# Patient Record
Sex: MALE | Race: WHITE | NOT HISPANIC OR LATINO | Employment: FULL TIME | URBAN - NONMETROPOLITAN AREA
[De-identification: names, ages, dates, MRNs, and addresses within clinical notes are randomized per-mention and may not be internally consistent; named-entity substitution may affect disease eponyms.]

---

## 2023-06-28 ENCOUNTER — TELEPHONE (OUTPATIENT)
Dept: PRIMARY CARE | Facility: CLINIC | Age: 68
End: 2023-06-28
Payer: MEDICARE

## 2023-06-28 DIAGNOSIS — K13.70 MOUTH LESION: Primary | ICD-10-CM

## 2023-06-28 DIAGNOSIS — D69.1 THROMBOCYTOPATHIA (MULTI): ICD-10-CM

## 2023-06-28 NOTE — TELEPHONE ENCOUNTER
"Left message for patient.    Also an FYI, he stated his dentist noticed a \"spot\" in his throat six months ago and the same dentist told him at his appointment today that the spot remained unchanged.  I asked him to contact his dentist and have them fax over their findings/images so if needed he could be referred to ENT or scheduled to see you.    "

## 2023-06-28 NOTE — TELEPHONE ENCOUNTER
Patient would like a new CBC order put in    He would like to continue donating blood and would like his platelet counts rechecked     Please call [phone] when this is ready

## 2023-06-29 NOTE — TELEPHONE ENCOUNTER
Left message for patient to call back and schedule (there is next week available in [place] on [facility])

## 2023-06-30 ENCOUNTER — LAB (OUTPATIENT)
Dept: LAB | Facility: LAB | Age: 68
End: 2023-06-30
Payer: MEDICARE

## 2023-06-30 DIAGNOSIS — D69.1 THROMBOCYTOPATHIA (MULTI): ICD-10-CM

## 2023-08-14 ENCOUNTER — LAB (OUTPATIENT)
Dept: LAB | Facility: LAB | Age: 68
End: 2023-08-14
Payer: MEDICARE

## 2023-08-14 DIAGNOSIS — D69.6 THROMBOCYTOPENIA (CMS-HCC): ICD-10-CM

## 2023-08-14 DIAGNOSIS — D69.6 THROMBOCYTOPENIA (CMS-HCC): Primary | ICD-10-CM

## 2023-08-14 PROCEDURE — 36415 COLL VENOUS BLD VENIPUNCTURE: CPT

## 2023-08-14 PROCEDURE — 85025 COMPLETE CBC W/AUTO DIFF WBC: CPT

## 2023-08-15 LAB
BASOPHILS (10*3/UL) IN BLOOD BY AUTOMATED COUNT: 0.03 X10E9/L (ref 0–0.1)
BASOPHILS/100 LEUKOCYTES IN BLOOD BY AUTOMATED COUNT: 0.4 % (ref 0–2)
EOSINOPHILS (10*3/UL) IN BLOOD BY AUTOMATED COUNT: 0.32 X10E9/L (ref 0–0.7)
EOSINOPHILS/100 LEUKOCYTES IN BLOOD BY AUTOMATED COUNT: 4.7 % (ref 0–6)
ERYTHROCYTE DISTRIBUTION WIDTH (RATIO) BY AUTOMATED COUNT: 13.1 % (ref 11.5–14.5)
ERYTHROCYTE MEAN CORPUSCULAR HEMOGLOBIN CONCENTRATION (G/DL) BY AUTOMATED: 32.3 G/DL (ref 32–36)
ERYTHROCYTE MEAN CORPUSCULAR VOLUME (FL) BY AUTOMATED COUNT: 91 FL (ref 80–100)
ERYTHROCYTES (10*6/UL) IN BLOOD BY AUTOMATED COUNT: 5.24 X10E12/L (ref 4.5–5.9)
HEMATOCRIT (%) IN BLOOD BY AUTOMATED COUNT: 47.7 % (ref 41–52)
HEMOGLOBIN (G/DL) IN BLOOD: 15.4 G/DL (ref 13.5–17.5)
IMMATURE GRANULOCYTES/100 LEUKOCYTES IN BLOOD BY AUTOMATED COUNT: 0.3 % (ref 0–0.9)
LEUKOCYTES (10*3/UL) IN BLOOD BY AUTOMATED COUNT: 6.8 X10E9/L (ref 4.4–11.3)
LYMPHOCYTES (10*3/UL) IN BLOOD BY AUTOMATED COUNT: 1.48 X10E9/L (ref 1.2–4.8)
LYMPHOCYTES/100 LEUKOCYTES IN BLOOD BY AUTOMATED COUNT: 21.7 % (ref 13–44)
MONOCYTES (10*3/UL) IN BLOOD BY AUTOMATED COUNT: 0.62 X10E9/L (ref 0.1–1)
MONOCYTES/100 LEUKOCYTES IN BLOOD BY AUTOMATED COUNT: 9.1 % (ref 2–10)
NEUTROPHILS (10*3/UL) IN BLOOD BY AUTOMATED COUNT: 4.36 X10E9/L (ref 1.2–7.7)
NEUTROPHILS/100 LEUKOCYTES IN BLOOD BY AUTOMATED COUNT: 63.8 % (ref 40–80)
NRBC (PER 100 WBCS) BY AUTOMATED COUNT: 0 /100 WBC (ref 0–0)
PLATELETS (10*3/UL) IN BLOOD AUTOMATED COUNT: 105 X10E9/L (ref 150–450)
RBC MORPHOLOGY IN BLOOD: NORMAL

## 2023-12-04 ENCOUNTER — OFFICE VISIT (OUTPATIENT)
Dept: PRIMARY CARE | Facility: CLINIC | Age: 68
End: 2023-12-04
Payer: MEDICARE

## 2023-12-04 ENCOUNTER — LAB (OUTPATIENT)
Dept: LAB | Facility: LAB | Age: 68
End: 2023-12-04
Payer: MEDICARE

## 2023-12-04 VITALS
BODY MASS INDEX: 24.27 KG/M2 | HEART RATE: 66 BPM | OXYGEN SATURATION: 96 % | RESPIRATION RATE: 16 BRPM | TEMPERATURE: 97.1 F | HEIGHT: 72 IN | SYSTOLIC BLOOD PRESSURE: 106 MMHG | WEIGHT: 179.2 LBS | DIASTOLIC BLOOD PRESSURE: 66 MMHG

## 2023-12-04 DIAGNOSIS — Z12.5 SCREENING FOR PROSTATE CANCER: ICD-10-CM

## 2023-12-04 DIAGNOSIS — D69.6 THROMBOCYTOPENIA (CMS-HCC): ICD-10-CM

## 2023-12-04 DIAGNOSIS — Z00.00 MEDICARE ANNUAL WELLNESS VISIT, SUBSEQUENT: ICD-10-CM

## 2023-12-04 DIAGNOSIS — Z00.00 ROUTINE GENERAL MEDICAL EXAMINATION AT HEALTH CARE FACILITY: Primary | ICD-10-CM

## 2023-12-04 PROBLEM — R05.9 COUGHING: Status: ACTIVE | Noted: 2023-12-04

## 2023-12-04 PROBLEM — R73.01 FASTING HYPERGLYCEMIA: Status: ACTIVE | Noted: 2023-12-04

## 2023-12-04 PROBLEM — R73.01 FASTING HYPERGLYCEMIA: Status: RESOLVED | Noted: 2023-12-04 | Resolved: 2023-12-04

## 2023-12-04 PROBLEM — E87.5 HYPERKALEMIA: Status: ACTIVE | Noted: 2023-12-04

## 2023-12-04 PROBLEM — E87.5 HYPERKALEMIA: Status: RESOLVED | Noted: 2023-12-04 | Resolved: 2023-12-04

## 2023-12-04 PROBLEM — L98.9 SKIN LESION OF BACK: Status: RESOLVED | Noted: 2023-12-04 | Resolved: 2023-12-04

## 2023-12-04 PROBLEM — R09.89 THROAT CLEARING: Status: RESOLVED | Noted: 2023-12-04 | Resolved: 2023-12-04

## 2023-12-04 PROBLEM — Z96.21 COCHLEAR IMPLANT IN PLACE: Status: ACTIVE | Noted: 2023-12-04

## 2023-12-04 PROBLEM — E78.5 MILD HYPERLIPIDEMIA: Status: ACTIVE | Noted: 2023-12-04

## 2023-12-04 PROBLEM — R09.89 THROAT CLEARING: Status: ACTIVE | Noted: 2023-12-04

## 2023-12-04 PROBLEM — R05.9 COUGHING: Status: RESOLVED | Noted: 2023-12-04 | Resolved: 2023-12-04

## 2023-12-04 PROBLEM — L98.9 SKIN LESION OF BACK: Status: ACTIVE | Noted: 2023-12-04

## 2023-12-04 PROBLEM — E78.5 MILD HYPERLIPIDEMIA: Status: RESOLVED | Noted: 2023-12-04 | Resolved: 2023-12-04

## 2023-12-04 PROBLEM — R42 DISEQUILIBRIUM: Status: ACTIVE | Noted: 2023-12-04

## 2023-12-04 LAB
ALBUMIN SERPL BCP-MCNC: 4.4 G/DL (ref 3.4–5)
ALP SERPL-CCNC: 69 U/L (ref 33–136)
ALT SERPL W P-5'-P-CCNC: 23 U/L (ref 10–52)
ANION GAP SERPL CALC-SCNC: 14 MMOL/L (ref 10–20)
AST SERPL W P-5'-P-CCNC: 18 U/L (ref 9–39)
BILIRUB SERPL-MCNC: 0.7 MG/DL (ref 0–1.2)
BUN SERPL-MCNC: 16 MG/DL (ref 6–23)
CALCIUM SERPL-MCNC: 9.2 MG/DL (ref 8.6–10.6)
CHLORIDE SERPL-SCNC: 102 MMOL/L (ref 98–107)
CHOLEST SERPL-MCNC: 193 MG/DL (ref 0–199)
CHOLESTEROL/HDL RATIO: 4.3
CO2 SERPL-SCNC: 28 MMOL/L (ref 21–32)
CREAT SERPL-MCNC: 0.97 MG/DL (ref 0.5–1.3)
ERYTHROCYTE [DISTWIDTH] IN BLOOD BY AUTOMATED COUNT: 12.4 % (ref 11.5–14.5)
GFR SERPL CREATININE-BSD FRML MDRD: 86 ML/MIN/1.73M*2
GLUCOSE SERPL-MCNC: 108 MG/DL (ref 74–99)
HCT VFR BLD AUTO: 49.4 % (ref 41–52)
HDLC SERPL-MCNC: 44.5 MG/DL
HGB BLD-MCNC: 16.3 G/DL (ref 13.5–17.5)
LDLC SERPL CALC-MCNC: 110 MG/DL
MCH RBC QN AUTO: 29.4 PG (ref 26–34)
MCHC RBC AUTO-ENTMCNC: 33 G/DL (ref 32–36)
MCV RBC AUTO: 89 FL (ref 80–100)
NON HDL CHOLESTEROL: 149 MG/DL (ref 0–149)
NRBC BLD-RTO: 0 /100 WBCS (ref 0–0)
PLATELET # BLD AUTO: 114 X10*3/UL (ref 150–450)
POTASSIUM SERPL-SCNC: 4.6 MMOL/L (ref 3.5–5.3)
PROT SERPL-MCNC: 6.9 G/DL (ref 6.4–8.2)
PSA SERPL-MCNC: 1.76 NG/ML
RBC # BLD AUTO: 5.55 X10*6/UL (ref 4.5–5.9)
SODIUM SERPL-SCNC: 139 MMOL/L (ref 136–145)
TRIGL SERPL-MCNC: 195 MG/DL (ref 0–149)
VLDL: 39 MG/DL (ref 0–40)
WBC # BLD AUTO: 6.6 X10*3/UL (ref 4.4–11.3)

## 2023-12-04 PROCEDURE — 1160F RVW MEDS BY RX/DR IN RCRD: CPT | Performed by: FAMILY MEDICINE

## 2023-12-04 PROCEDURE — G0103 PSA SCREENING: HCPCS

## 2023-12-04 PROCEDURE — 85027 COMPLETE CBC AUTOMATED: CPT

## 2023-12-04 PROCEDURE — 80053 COMPREHEN METABOLIC PANEL: CPT

## 2023-12-04 PROCEDURE — G0439 PPPS, SUBSEQ VISIT: HCPCS | Performed by: FAMILY MEDICINE

## 2023-12-04 PROCEDURE — 1159F MED LIST DOCD IN RCRD: CPT | Performed by: FAMILY MEDICINE

## 2023-12-04 PROCEDURE — 80061 LIPID PANEL: CPT

## 2023-12-04 PROCEDURE — 36415 COLL VENOUS BLD VENIPUNCTURE: CPT

## 2023-12-04 PROCEDURE — 1036F TOBACCO NON-USER: CPT | Performed by: FAMILY MEDICINE

## 2023-12-04 PROCEDURE — 1170F FXNL STATUS ASSESSED: CPT | Performed by: FAMILY MEDICINE

## 2023-12-04 RX ORDER — PANTOPRAZOLE SODIUM 40 MG/1
TABLET, DELAYED RELEASE ORAL
COMMUNITY
Start: 2023-07-28 | End: 2023-12-04 | Stop reason: WASHOUT

## 2023-12-04 ASSESSMENT — PATIENT HEALTH QUESTIONNAIRE - PHQ9
SUM OF ALL RESPONSES TO PHQ9 QUESTIONS 1 AND 2: 0
2. FEELING DOWN, DEPRESSED OR HOPELESS: NOT AT ALL
1. LITTLE INTEREST OR PLEASURE IN DOING THINGS: NOT AT ALL

## 2023-12-04 ASSESSMENT — ACTIVITIES OF DAILY LIVING (ADL)
GROCERY_SHOPPING: INDEPENDENT
DRESSING: INDEPENDENT
MANAGING_FINANCES: INDEPENDENT
DOING_HOUSEWORK: INDEPENDENT
TAKING_MEDICATION: INDEPENDENT
BATHING: INDEPENDENT

## 2023-12-04 NOTE — PROGRESS NOTES
Subjective   Reason for Visit: Jorge Farr is an 68 y.o. male here for a Medicare Wellness visit.     Past Medical, Surgical, and Family History reviewed and updated in chart.  Stopped giving blood due to low platelets over a year ago.    No health issues or problems.   No falls.  Does want to recheck platelets.    No headaches, no chest pain or SHORTNESS OF BREATH, no GERD, no constipation or diarrhea    Reviewed all medications by prescribing practitioner or clinical pharmacist (such as prescriptions, OTCs, herbal therapies and supplements) and documented in the medical record.      Patient Care Team:  Poncho Lincoln MD as PCP - General  Poncho Lincoln MD as PCP - [employer] PCP         Objective   Vitals:  /66 (BP Location: Left arm, Patient Position: Sitting, BP Cuff Size: Adult)   Pulse 66   Temp 36.2 °C (97.1 °F)   Resp 16   Ht 1.829 m (6')   Wt 81.3 kg (179 lb 3.2 oz)   SpO2 96%   BMI 24.30 kg/m²       Physical Exam  Constitutional:       General: He is not in acute distress.  HENT:      Head: Normocephalic and atraumatic.      Right Ear: Tympanic membrane, ear canal and external ear normal.      Left Ear: Tympanic membrane, ear canal and external ear normal.      Nose: Nose normal.      Mouth/Throat:      Mouth: Mucous membranes are moist.      Pharynx: No posterior oropharyngeal erythema.   Eyes:      General: No scleral icterus.     Extraocular Movements: Extraocular movements intact.      Pupils: Pupils are equal, round, and reactive to light.   Cardiovascular:      Rate and Rhythm: Normal rate and regular rhythm.      Pulses: Normal pulses.      Heart sounds: No murmur heard.  Pulmonary:      Effort: Pulmonary effort is normal. No respiratory distress.      Breath sounds: Normal breath sounds. No wheezing.   Abdominal:      General: Bowel sounds are normal. There is no distension.      Palpations: Abdomen is soft.      Tenderness: There is no abdominal tenderness.    Musculoskeletal:         General: Normal range of motion.      Cervical back: Neck supple. No rigidity.      Right lower leg: No edema.      Left lower leg: No edema.   Lymphadenopathy:      Cervical: No cervical adenopathy.   Skin:     General: Skin is warm and dry.      Findings: No rash.   Neurological:      General: No focal deficit present.      Mental Status: He is alert and oriented to person, place, and time.   Psychiatric:         Mood and Affect: Mood normal.         Thought Content: Thought content normal.         Assessment/Plan   Problem List Items Addressed This Visit       Thrombocytopenia (CMS/HCC)    Current Assessment & Plan     Last platelet count was 105 in 8/2023, had been 147 a year ago. No bleeding issues currently.          Relevant Orders    CBC     Other Visit Diagnoses       Routine general medical examination at health care facility    -  Primary    Medicare annual wellness visit, subsequent        Relevant Orders    CBC    Lipid panel    Comprehensive metabolic panel    Screening for prostate cancer        Relevant Orders    Prostate Spec.Ag,Screen

## 2024-03-18 NOTE — PROGRESS NOTES
Subjective   Patient ID: Jorge Farr is a 68 y.o. male who presents for No chief complaint on file..  HPI    Review of Systems    Objective   Physical Exam    Assessment/Plan   {Assess/PlanSmartLinks:65082}         Farhan Rajan DMD, MD 03/18/24 10:00 AM

## 2024-03-25 ENCOUNTER — APPOINTMENT (OUTPATIENT)
Dept: OTOLARYNGOLOGY | Facility: CLINIC | Age: 69
End: 2024-03-25
Payer: MEDICARE

## 2024-03-27 ENCOUNTER — OFFICE VISIT (OUTPATIENT)
Dept: OTOLARYNGOLOGY | Facility: CLINIC | Age: 69
End: 2024-03-27
Payer: MEDICARE

## 2024-03-27 VITALS — WEIGHT: 178 LBS | BODY MASS INDEX: 24.14 KG/M2

## 2024-03-27 DIAGNOSIS — K21.9 LARYNGOPHARYNGEAL REFLUX (LPR): Primary | ICD-10-CM

## 2024-03-27 DIAGNOSIS — R09.89 THROAT CLEARING: ICD-10-CM

## 2024-03-27 PROCEDURE — 1036F TOBACCO NON-USER: CPT | Performed by: STUDENT IN AN ORGANIZED HEALTH CARE EDUCATION/TRAINING PROGRAM

## 2024-03-27 PROCEDURE — 31575 DIAGNOSTIC LARYNGOSCOPY: CPT | Performed by: STUDENT IN AN ORGANIZED HEALTH CARE EDUCATION/TRAINING PROGRAM

## 2024-03-27 PROCEDURE — 99213 OFFICE O/P EST LOW 20 MIN: CPT | Performed by: STUDENT IN AN ORGANIZED HEALTH CARE EDUCATION/TRAINING PROGRAM

## 2024-03-27 PROCEDURE — 1159F MED LIST DOCD IN RCRD: CPT | Performed by: STUDENT IN AN ORGANIZED HEALTH CARE EDUCATION/TRAINING PROGRAM

## 2024-03-27 ASSESSMENT — PATIENT HEALTH QUESTIONNAIRE - PHQ9
2. FEELING DOWN, DEPRESSED OR HOPELESS: NOT AT ALL
SUM OF ALL RESPONSES TO PHQ9 QUESTIONS 1 AND 2: 0
1. LITTLE INTEREST OR PLEASURE IN DOING THINGS: NOT AT ALL

## 2024-03-27 NOTE — PROGRESS NOTES
Assessment  Laryngopharyngeal reflux  Throat clearing  History of oral/throat lesion     Plan  68-year-old male presenting for evaluation of possible oral/ throat lesion noted by his dentist.  No lesions/masses or ulcerations noted on physical exam or flexible nasopharyngoscopy.  Reflux symptomatology/throat clearing has resolved with dietary modifications.   He will continue to monitor the region and follow-up as needed.       History of Present Illness  3/27/24  The patient presents for follow-up, states he has been doing well.  No oropharyngeal complaints.  9/25/23  The patient presents for follow-up, reports reflux symptomatology has resolved including throat clearing and coughing.  Currently controlled with dietary modifications, did not start pantoprazole. No complaints.  Recall   67-year-old male presenting for initial evaluation of multiple ENT complaints.  He was elevated by his dentist > 1 month ago where a possible throat lesion was noted, he presents today for evaluation. The patient reports he is asymptomatic, denies dysphagia, odynophagia, oral bleeding/hemoptysis, fevers, chills, night sweats, neck pain, neck lumps or bumps, otalgia, weight loss.  The patient also endorses throat clearing and coughing happening several times a week.  Denies sinonasal symptomatology or heartburn     Of note the patient has a history of left CI    Past Medical History:   Diagnosis Date    Abdominal distension (gaseous) 07/22/2016    Abdominal bloating    Flatulence 07/22/2016    Excessive flatus    HL (hearing loss) At birth    Migraine with aura, not intractable, without status migrainosus 04/19/2017    Ophthalmic migraine    Other specified symptoms and signs involving the digestive system and abdomen 07/22/2016    Change in bowel function    Personal history of other diseases of male genital organs     History of hydrocele    Personal history of other diseases of the musculoskeletal system and connective tissue      History of arthritis    Personal history of other diseases of the nervous system and sense organs 02/14/2017    History of migraine with aura    Transient cerebral ischemic attack, unspecified 04/19/2017    TIA (transient ischemic attack)    Unspecified visual disturbance 04/19/2017    Vision changes       Past Surgical History:   Procedure Laterality Date    CT ANGIO NECK  12/14/2016    CT NECK ANGIO W AND WO IV CONTRAST 12/14/2016 [facility] [place] [facility]    CT HEAD ANGIO W AND WO IV CONTRAST  12/14/2016    CT HEAD ANGIO W AND WO IV CONTRAST 12/14/2016 [facility] [place] [facility]    FRACTURE SURGERY  Broken left collarbone    JOINT REPLACEMENT  Right knee replacement    OTHER SURGICAL HISTORY  08/26/2014    Closed Treatment Of Clavicular Fracture    OTHER SURGICAL HISTORY  08/26/2014    Inner Ear Surgery Cochlear Device Implantation    OTHER SURGICAL HISTORY  11/12/2018    Cochlear implant surgery    OTHER SURGICAL HISTORY  11/12/2018    Knee arthroscopy         No current outpatient medications on file prior to visit.     No current facility-administered medications on file prior to visit.        No Known Allergies     Review of Systems  A detailed 12 point ROS was performed and is negative except as noted in the intake form, HPI and/or Past Medical History        Physical Exam  CONSTITUTIONAL: Well-developed, NAD  VOICE: Normal voice quality  RESPIRATION: Breathing comfortably, no stridor.  CV: No clubbing/cyanosis/edema in hands.  EYES: EOM Intact, sclera normal.  NEURO: Alert and oriented times 3, Cranial nerves V,VII intact and symmetric bilaterally.  HEAD AND FACE: Symmetric facial features, no masses or lesions, sinuses nontender to palpation.  SALIVARY GLANDS: Parotid and submandibular glands normal bilaterally.  EARS: Normal external ears, external auditory canals, and TMs to otoscopy.   Left CI in place.   NOSE: External nose midline, anterior rhinoscopy is normal with limited visualization to the anterior aspect  of the interior turbinates. No lesions noted.  ORAL CAVITY/OROPHARYNX/LIPS: Normal mucous membranes, normal floor of mouth/tongue/OP, no masses or lesions are noted.  PHARYNGEAL WALLS AND NASOPHARYNX: No masses noted. Mucosa appears clean and moist  NECK/LYMPH: No LAD, no thyroid masses. Trachea palpably midline  SKIN: Neck skin is without injury  PSYCH: Alert and oriented with appropriate mood and affect        Procedure  Nasal endoscopy:  PROCEDURE NOTE     For better visualization because of septal deviation, turbinate hypertrophy nasal endoscopy was performed after verbal consent was obtained by the patient and/or guardian. Both nostrils were sprayed with a mixture of lidocaine 4% and Afrin. After a sufficient amount of time elapsed for mucosal anesthesia to take place, the nasal endoscope was advanced into the nostril.     The following areas were visualized:  Nasal passage, nasal septum, turbinates, middle meatus, nasopharynx, sinus ostia     The patient tolerated the procedure well and these structures were found to be normal except as follows:  No lesions/masses  Mobile true vocal cords bilaterally  Moderate postcricoid edema  No pooling secretions  No mucopurulence, polyps, nor masses seen in inferior meati, middle meati, nor sphenoethmoidal recesses bilaterally.

## 2024-06-18 ENCOUNTER — TELEPHONE (OUTPATIENT)
Dept: PRIMARY CARE | Facility: CLINIC | Age: 69
End: 2024-06-18
Payer: MEDICARE

## 2024-06-18 NOTE — TELEPHONE ENCOUNTER
I called pt to get him scheduled for mwv   Last one was 12/2023  He can be seen anytime for this visit first available with sdh

## 2024-06-24 DIAGNOSIS — E78.2 MODERATE MIXED HYPERLIPIDEMIA NOT REQUIRING STATIN THERAPY: ICD-10-CM

## 2024-06-24 DIAGNOSIS — D69.6 THROMBOCYTOPENIA (CMS-HCC): ICD-10-CM

## 2024-06-24 DIAGNOSIS — R73.01 ELEVATED FASTING BLOOD SUGAR: ICD-10-CM

## 2024-06-24 DIAGNOSIS — Z00.00 WELLNESS EXAMINATION: Primary | ICD-10-CM

## 2024-06-24 DIAGNOSIS — Z12.5 PROSTATE CANCER SCREENING: ICD-10-CM

## 2024-06-25 NOTE — TELEPHONE ENCOUNTER
Left detailed message on pt's vm that lab orders were placed. Advised pt to cb if he had any further questions/concerns.

## 2024-09-23 ENCOUNTER — TELEPHONE (OUTPATIENT)
Dept: PRIMARY CARE | Facility: CLINIC | Age: 69
End: 2024-09-23
Payer: MEDICARE

## 2024-09-23 DIAGNOSIS — U07.1 COVID-19: Primary | ICD-10-CM

## 2024-09-23 NOTE — TELEPHONE ENCOUNTER
Onset of Covid illness- 9/21  Date & Location of positive covid 19 test- 9/22  (If negative test please list details of exposure and why requesting MOAB treatment )  Current pregnancy - no  Vaccinated for covid 19- Yes no recent booster  Current Symptoms  - fever, sore throat, cough, fatigue, describes as mild/moderate  Temperature- 101.5  Current Pulse O2 - N/A    If RX for Paxlovid is appropriate, is patient willing to use [facility]? Yes   If yes, [facility]? [facility], added to chart   If no, patient's preferred pharmacy is:  Patient was instructed to call pharmacy to confirm that Paxlovid is available and in stock.    I have asked patient and they report they have NO increased SOB, No CP, No confusion, No change in skin color ( blue or gray).  Next available virtual opening is - 9/24    Provider if you can please review patient's chart and medical history and provide detailed instructions on next steps in this task.   Thank you    Asking for paxlovid

## 2024-12-20 ENCOUNTER — LAB (OUTPATIENT)
Dept: LAB | Facility: LAB | Age: 69
End: 2024-12-20
Payer: MEDICARE

## 2024-12-20 DIAGNOSIS — D69.6 THROMBOCYTOPENIA (CMS-HCC): ICD-10-CM

## 2024-12-20 DIAGNOSIS — E78.2 MODERATE MIXED HYPERLIPIDEMIA NOT REQUIRING STATIN THERAPY: ICD-10-CM

## 2024-12-20 DIAGNOSIS — R73.01 ELEVATED FASTING BLOOD SUGAR: ICD-10-CM

## 2024-12-20 DIAGNOSIS — Z12.5 PROSTATE CANCER SCREENING: ICD-10-CM

## 2024-12-20 LAB
ALBUMIN SERPL BCP-MCNC: 4.5 G/DL (ref 3.4–5)
ALP SERPL-CCNC: 61 U/L (ref 33–136)
ALT SERPL W P-5'-P-CCNC: 20 U/L (ref 10–52)
ANION GAP SERPL CALC-SCNC: 8 MMOL/L (ref 10–20)
AST SERPL W P-5'-P-CCNC: 19 U/L (ref 9–39)
BASOPHILS # BLD AUTO: 0.03 X10*3/UL (ref 0–0.1)
BASOPHILS NFR BLD AUTO: 0.6 %
BILIRUB SERPL-MCNC: 0.7 MG/DL (ref 0–1.2)
BUN SERPL-MCNC: 17 MG/DL (ref 6–23)
CALCIUM SERPL-MCNC: 9.3 MG/DL (ref 8.6–10.6)
CHLORIDE SERPL-SCNC: 105 MMOL/L (ref 98–107)
CHOLEST SERPL-MCNC: 165 MG/DL (ref 0–199)
CHOLESTEROL/HDL RATIO: 3.5
CO2 SERPL-SCNC: 33 MMOL/L (ref 21–32)
CREAT SERPL-MCNC: 1.06 MG/DL (ref 0.5–1.3)
EGFRCR SERPLBLD CKD-EPI 2021: 76 ML/MIN/1.73M*2
EOSINOPHIL # BLD AUTO: 0.29 X10*3/UL (ref 0–0.7)
EOSINOPHIL NFR BLD AUTO: 5.9 %
ERYTHROCYTE [DISTWIDTH] IN BLOOD BY AUTOMATED COUNT: 12.6 % (ref 11.5–14.5)
EST. AVERAGE GLUCOSE BLD GHB EST-MCNC: 105 MG/DL
GLUCOSE SERPL-MCNC: 101 MG/DL (ref 74–99)
HBA1C MFR BLD: 5.3 %
HCT VFR BLD AUTO: 50 % (ref 41–52)
HDLC SERPL-MCNC: 46.7 MG/DL
HGB BLD-MCNC: 16.3 G/DL (ref 13.5–17.5)
IMM GRANULOCYTES # BLD AUTO: 0.01 X10*3/UL (ref 0–0.7)
IMM GRANULOCYTES NFR BLD AUTO: 0.2 % (ref 0–0.9)
LDLC SERPL CALC-MCNC: 93 MG/DL
LYMPHOCYTES # BLD AUTO: 1.37 X10*3/UL (ref 1.2–4.8)
LYMPHOCYTES NFR BLD AUTO: 28.1 %
MCH RBC QN AUTO: 29.1 PG (ref 26–34)
MCHC RBC AUTO-ENTMCNC: 32.6 G/DL (ref 32–36)
MCV RBC AUTO: 89 FL (ref 80–100)
MONOCYTES # BLD AUTO: 0.39 X10*3/UL (ref 0.1–1)
MONOCYTES NFR BLD AUTO: 8 %
NEUTROPHILS # BLD AUTO: 2.79 X10*3/UL (ref 1.2–7.7)
NEUTROPHILS NFR BLD AUTO: 57.2 %
NON HDL CHOLESTEROL: 118 MG/DL (ref 0–149)
NRBC BLD-RTO: 0 /100 WBCS (ref 0–0)
PLATELET # BLD AUTO: 129 X10*3/UL (ref 150–450)
POTASSIUM SERPL-SCNC: 4.7 MMOL/L (ref 3.5–5.3)
PROT SERPL-MCNC: 7.1 G/DL (ref 6.4–8.2)
PSA SERPL-MCNC: 1.72 NG/ML
RBC # BLD AUTO: 5.61 X10*6/UL (ref 4.5–5.9)
SODIUM SERPL-SCNC: 141 MMOL/L (ref 136–145)
TRIGL SERPL-MCNC: 127 MG/DL (ref 0–149)
VLDL: 25 MG/DL (ref 0–40)
WBC # BLD AUTO: 4.9 X10*3/UL (ref 4.4–11.3)

## 2024-12-20 PROCEDURE — 83036 HEMOGLOBIN GLYCOSYLATED A1C: CPT

## 2024-12-20 PROCEDURE — 80053 COMPREHEN METABOLIC PANEL: CPT

## 2024-12-20 PROCEDURE — G0103 PSA SCREENING: HCPCS

## 2024-12-20 PROCEDURE — 85025 COMPLETE CBC W/AUTO DIFF WBC: CPT

## 2024-12-20 PROCEDURE — 80061 LIPID PANEL: CPT

## 2024-12-20 PROCEDURE — 36415 COLL VENOUS BLD VENIPUNCTURE: CPT

## 2024-12-27 ENCOUNTER — APPOINTMENT (OUTPATIENT)
Dept: PRIMARY CARE | Facility: CLINIC | Age: 69
End: 2024-12-27
Payer: MEDICARE

## 2024-12-27 VITALS
OXYGEN SATURATION: 97 % | BODY MASS INDEX: 24.46 KG/M2 | HEART RATE: 68 BPM | SYSTOLIC BLOOD PRESSURE: 102 MMHG | DIASTOLIC BLOOD PRESSURE: 52 MMHG | WEIGHT: 180.6 LBS | TEMPERATURE: 97.5 F | HEIGHT: 72 IN

## 2024-12-27 DIAGNOSIS — Z00.00 WELLNESS EXAMINATION: ICD-10-CM

## 2024-12-27 DIAGNOSIS — Z00.00 MEDICARE ANNUAL WELLNESS VISIT, SUBSEQUENT: Primary | ICD-10-CM

## 2024-12-27 DIAGNOSIS — R19.05 PERIUMBILICAL MASS: ICD-10-CM

## 2024-12-27 DIAGNOSIS — H90.3 SENSORINEURAL HEARING LOSS, BILATERAL: ICD-10-CM

## 2024-12-27 DIAGNOSIS — Z96.21 COCHLEAR IMPLANT IN PLACE: ICD-10-CM

## 2024-12-27 DIAGNOSIS — Z12.5 PROSTATE CANCER SCREENING: ICD-10-CM

## 2024-12-27 DIAGNOSIS — D69.6 THROMBOCYTOPENIA (CMS-HCC): ICD-10-CM

## 2024-12-27 PROCEDURE — 99213 OFFICE O/P EST LOW 20 MIN: CPT | Performed by: FAMILY MEDICINE

## 2024-12-27 PROCEDURE — 1159F MED LIST DOCD IN RCRD: CPT | Performed by: FAMILY MEDICINE

## 2024-12-27 PROCEDURE — G0439 PPPS, SUBSEQ VISIT: HCPCS | Performed by: FAMILY MEDICINE

## 2024-12-27 PROCEDURE — 3008F BODY MASS INDEX DOCD: CPT | Performed by: FAMILY MEDICINE

## 2024-12-27 PROCEDURE — 1123F ACP DISCUSS/DSCN MKR DOCD: CPT | Performed by: FAMILY MEDICINE

## 2024-12-27 PROCEDURE — 1160F RVW MEDS BY RX/DR IN RCRD: CPT | Performed by: FAMILY MEDICINE

## 2024-12-27 PROCEDURE — 1170F FXNL STATUS ASSESSED: CPT | Performed by: FAMILY MEDICINE

## 2024-12-27 ASSESSMENT — ENCOUNTER SYMPTOMS
EYES NEGATIVE: 1
DIARRHEA: 0
CHEST TIGHTNESS: 0
SHORTNESS OF BREATH: 0
NAUSEA: 0
ENDOCRINE NEGATIVE: 1
CONSTITUTIONAL NEGATIVE: 1
MUSCULOSKELETAL NEGATIVE: 1
CONSTIPATION: 0
NEUROLOGICAL NEGATIVE: 1
HEMATOLOGIC/LYMPHATIC NEGATIVE: 1
GASTROINTESTINAL NEGATIVE: 1
PSYCHIATRIC NEGATIVE: 1
CARDIOVASCULAR NEGATIVE: 1
ALLERGIC/IMMUNOLOGIC NEGATIVE: 1
RESPIRATORY NEGATIVE: 1
VOMITING: 0

## 2024-12-27 ASSESSMENT — ACTIVITIES OF DAILY LIVING (ADL)
GROCERY_SHOPPING: INDEPENDENT
BATHING: INDEPENDENT
DRESSING: INDEPENDENT
DOING_HOUSEWORK: INDEPENDENT
TAKING_MEDICATION: INDEPENDENT
MANAGING_FINANCES: INDEPENDENT

## 2024-12-27 NOTE — PATIENT INSTRUCTIONS
1.  Well visit    Today in the office you had your annual wellness exam    You are up-to-date with your colonoscopy for colon cancer screening.    You are up-to-date with the PSA for prostate cancer screening your PSA was low normal indicating low risk for prostate disease    You are up-to-date with all your vaccines including your flu shot COVID booster pneumonia vaccines and your shingles vaccines    We did review your recent labs kidney function tests are normal liver enzymes are normal cholesterol is normal hemoglobin A1c is normal indicating no diabetes    Your platelet count remains slightly below normal we will continue to monitor annually I think it is safe for you to donate blood twice a year    On exam today you have some fullness right around the umbilicus or bellybutton I am concerned you may have a hernia here I am recommending you for start with an x-ray depending on those results we may need to get another scan or have you meet with a specialist    If you otherwise stay healthy I will see you back in 1 year but I will call you on the phone when I have the results of the x-ray

## 2024-12-27 NOTE — PROGRESS NOTES
Subjective   Patient ID: Jorge Farr is a 69 y.o. male who presents for Medicare Annual Wellness Visit Subsequent ([facility]).    HPI   The patient reports left knee pain. This is not effecting his quality of life.     The patient denies any changes in vision, hearing or dental.     The patient maintains they do not have any chest pain, chest tightness or shortness of breath.    They do not experience nausea, emesis, changes in bowel movements or dyspepsia.    The patient is trying to stay active and healthy. They are currently exercising and remaining physically active. The aare maintaining a heathy diet that includes green leafy vegetables, fruits and proteins. They arestaying well hydrated.    The patient denies changes in or worsening of moods.    The patient denies nocturia. They report urinating no times a night. The nocturia does not cause sleep disturbances.     The patient denies any issues with erections.    He denies skin rashes and changes.     The patient's colonoscopy is up to date. It was last done  12/20/24 . Follow-up colonoscopy is to be done in  2032 .    The patient's vaccinations are up to date.    Review of Systems   Constitutional: Negative.    HENT: Negative.  Negative for dental problem and hearing loss.    Eyes: Negative.  Negative for visual disturbance.   Respiratory: Negative.  Negative for chest tightness and shortness of breath.    Cardiovascular: Negative.  Negative for chest pain.   Gastrointestinal: Negative.  Negative for constipation, diarrhea, nausea and vomiting.   Endocrine: Negative.    Genitourinary: Negative.    Musculoskeletal: Negative.    Skin: Negative.    Allergic/Immunologic: Negative.    Neurological: Negative.    Hematological: Negative.    Psychiatric/Behavioral: Negative.       14/14 systems reviewed and negative other than what is listed in the history of present illness    Objective   /52 (BP Location: Right arm, Patient Position: Sitting, BP Cuff Size: Large  adult)   Pulse 68   Temp 36.4 °C (97.5 °F) (Temporal)   Ht 1.829 m (6')   Wt 81.9 kg (180 lb 9.6 oz)   SpO2 97%   BMI 24.49 kg/m²     Physical Exam  Constitutional:       Appearance: Normal appearance.   HENT:      Head: Normocephalic and atraumatic.      Nose: Nose normal.   Eyes:      Extraocular Movements: Extraocular movements intact.      Conjunctiva/sclera: Conjunctivae normal.      Pupils: Pupils are equal, round, and reactive to light.   Cardiovascular:      Rate and Rhythm: Normal rate and regular rhythm.      Pulses: Normal pulses.      Heart sounds: Normal heart sounds.   Pulmonary:      Effort: Pulmonary effort is normal.      Breath sounds: Normal breath sounds and air entry.   Abdominal:      General: Bowel sounds are normal.      Palpations: Abdomen is soft. There is mass (abdominal fullness/mass around the umbilicus measuring 10 cm in diameter).   Musculoskeletal:         General: Normal range of motion.      Cervical back: Normal range of motion.   Neurological:      Mental Status: He is alert.   Psychiatric:         Mood and Affect: Mood normal.         Behavior: Behavior normal.         Thought Content: Thought content normal.         Judgment: Judgment normal.         Assessment/Plan   Diagnoses and all orders for this visit:  Medicare annual wellness visit, subsequent  Periumbilical mass  -     XR abdomen 3+ views; Future  Thrombocytopenia (CMS-HCC)  -     CBC and Auto Differential; Future  Sensorineural hearing loss, bilateral  Cochlear implant in place  Wellness examination  -     Comprehensive Metabolic Panel; Future  -     Lipid Panel; Future  -     TSH with reflex to Free T4 if abnormal; Future  -     Hemoglobin A1C; Future  Prostate cancer screening  -     Prostate Specific Antigen, Screen; Future         1. Medicare annual wellness visit, subsequent        2. Periumbilical mass  XR abdomen 3+ views      3. Thrombocytopenia (CMS-HCC)  CBC and Auto Differential      4. Sensorineural  hearing loss, bilateral        5. Cochlear implant in place        6. Wellness examination  Comprehensive Metabolic Panel    Lipid Panel    TSH with reflex to Free T4 if abnormal    Hemoglobin A1C      7. Prostate cancer screening  Prostate Specific Antigen, Screen        1.  Well visit    Today in the office you had your annual wellness exam    You are up-to-date with your colonoscopy for colon cancer screening.    You are up-to-date with the PSA for prostate cancer screening your PSA was low normal indicating low risk for prostate disease    You are up-to-date with all your vaccines including your flu shot COVID booster pneumonia vaccines and your shingles vaccines    We did review your recent labs kidney function tests are normal liver enzymes are normal cholesterol is normal hemoglobin A1c is normal indicating no diabetes    Your platelet count remains slightly below normal we will continue to monitor annually I think it is safe for you to donate blood twice a year    On exam today you have some fullness right around the umbilicus or bellybutton I am concerned you may have a hernia here I am recommending you for start with an x-ray depending on those results we may need to get another scan or have you meet with a specialist    If you otherwise stay healthy I will see you back in 1 year but I will call you on the phone when I have the results of the x-ray    Scribe Attestation  By signing my name below, IImani Scribe   attest that this documentation has been prepared under the direction and in the presence of Poncho Lincoln MD.    Scribe Attestation  By signing my name below, IPauline Scribe   attest that this documentation has been prepared under the direction and in the presence of Poncho Lincoln MD.     This note has been transcribed using a medical scribe and there is a possibility of unintentional typing misprints.

## 2025-01-02 PROBLEM — Z00.00 MEDICARE ANNUAL WELLNESS VISIT, SUBSEQUENT: Status: ACTIVE | Noted: 2025-01-02

## 2025-01-02 PROBLEM — R19.05 PERIUMBILICAL MASS: Status: ACTIVE | Noted: 2025-01-02

## 2025-01-29 ENCOUNTER — TELEPHONE (OUTPATIENT)
Dept: PRIMARY CARE | Facility: CLINIC | Age: 70
End: 2025-01-29
Payer: MEDICARE

## 2025-01-29 DIAGNOSIS — R19.05 PERIUMBILICAL MASS: Primary | ICD-10-CM

## 2025-01-29 NOTE — TELEPHONE ENCOUNTER
Please contact the patient and ask him if he had the xray done of his abdomen?  I have not seen the results,  I placed a new order if needed

## 2025-01-30 ENCOUNTER — HOSPITAL ENCOUNTER (OUTPATIENT)
Dept: RADIOLOGY | Facility: CLINIC | Age: 70
Discharge: HOME | End: 2025-01-30
Payer: MEDICARE

## 2025-01-30 DIAGNOSIS — R19.05 PERIUMBILICAL MASS: ICD-10-CM

## 2025-01-30 PROCEDURE — 74019 RADEX ABDOMEN 2 VIEWS: CPT

## 2025-02-12 ENCOUNTER — HOSPITAL ENCOUNTER (OUTPATIENT)
Dept: RADIOLOGY | Facility: CLINIC | Age: 70
Discharge: HOME | End: 2025-02-12
Payer: MEDICARE

## 2025-02-12 DIAGNOSIS — N32.0 BLADDER OUTLET OBSTRUCTION: Primary | ICD-10-CM

## 2025-02-12 DIAGNOSIS — R19.05 PERIUMBILICAL MASS: ICD-10-CM

## 2025-02-12 DIAGNOSIS — N32.3 DIVERTICULUM OF BLADDER: ICD-10-CM

## 2025-02-12 PROCEDURE — 76705 ECHO EXAM OF ABDOMEN: CPT | Performed by: RADIOLOGY

## 2025-02-12 PROCEDURE — 76705 ECHO EXAM OF ABDOMEN: CPT

## 2025-02-17 ENCOUNTER — TELEPHONE (OUTPATIENT)
Dept: UROLOGY | Facility: HOSPITAL | Age: 70
End: 2025-02-17
Payer: MEDICARE

## 2025-04-17 NOTE — PROGRESS NOTES
HPI    69 y.o. male being seen with the following problem list:    Problem list:  BPH    2/12/25 US abd  -Marked prostatic enlargement with an estimated volume of 231g.  -PVR 2106 mL with a palpable abnormality representing a markedly distended urinary bladder extending to the level of the umbilicus.  -No hydronephrosis of either kidney.  -Wide mouth diverticulum arising from left posterolateral aspect of the urinary bladder.    Cr 12/2024 - 1.06    04/17/25 - PVR 1400cc. Mostly asymptomatic, usually sleeps through the night. Occasionally with some abdominal discomfort resolved with voiding. Stream is fair. No leakage. No UTIs. No hematuria. No recent blood work.    PSA  12/2024 - 1.72  12/2023- 1.76  11/2022 - 1.64  11/2021 - 1.62    Lab Results   Component Value Date    PSA 1.55 10/26/2020    PSA 1.92 10/22/2019    PSA 1.28 10/12/2018              Current Medications:  Current Medications[1]     Active Problems:  Jorge Farr is a 69 y.o. male with the following Problems and Medications.  Problem List[2]  Current Medications[3]    PMH:  Medical History[4]    PSH:  Surgical History[5]    FMH:  Family History[6]    SHx:  Social History[7]    Allergies:  RX Allergies[8]    Physical Exam:  Distended bladder, palpable at level of umbilicus    Assessment/Plan  Massive urinary retention in setting of significantly enlarged prostate, approx 200g on recent US. Recent US shows no hydro, and labs from fall show normal/stable Cr. Will update BMP today. If evidence of upper tract deterioration, will need a johnson placed to decompress the bladder.     We had a long and thorough discussion regarding the natural history and options for treatment for bothersome prostatic enlargement.  We discussed that observation is an option for minimally symptomatic BPH and the role of medical therapy, but surgical management is recommended for bothersome symptoms despite appropriate medical therapy, when a patient desires to avoid  medications, severe or recurrent urinary tract infections, recurrent hematuria attributed to prostatic bleeding, urinary retention, or concern for upper tract damage caused by high pressure voiding and/or incomplete bladder emptying.     We discussed minimally invasive surgical therapies (MIST) including Rezum and UroLift along with their specific indications, risks, and benefits.  Given the size of his prostate, these would not be appropriate for him.  We discussed surgical options including transurethral resection of prostate (TURP), greenlight PVP, HoLEP, open simple prostatectomy, and robotic simple prostatectomy.  Again, given the size of his gland he would benefit from an enucleative approach.  We discussed the relative merits of robotic simple prostatectomy, open simple prostatectomy, and holmium laser enucleation of the prostate.  In particular, given the minimally invasive approach with associated short duration of catheter, low complication rate, and possibility for an outpatient or overnight stay, I recommended he consider a HoLEP.     We had a long discussion about holmium laser enucleation of the prostate.  I explained how the procedure is done and delilah diagrams.  I discussed the perioperative pathway, likely 1 night with a catheter and either outpatient or overnight observation in the hospital.  Discussed risks including acute and delayed bleeding, infection, risk of incontinence, risk of anejaculation.  In particular, regarding the risk of temporary incontinence we discussed the literature that reports approximately a third of men at 3 months will have some degree of bothersome leakage, but that number drops to less than 1% at 1 year.  Discussed the low probability of blood transfusion.  I discussed that following the procedure he would not be able to ejaculate, but would still obtain erection and orgasm at his current sexual function. We discussed the possibility of repeat operation, though uncommon  with HoLEP.     May not empty to completion, but goal is to maximally deobstruct the bladder to allow for maximal emptying and prevent further deterioration of bladder and upper tracts.    Scribe Attestation  By signing my name below, I, LexOliver Hoyt, attest that this documentation has been prepared under the direction and in the presence of Kavon Hines MD.           [1]   No current outpatient medications on file.     No current facility-administered medications for this visit.   [2]   Patient Active Problem List  Diagnosis    Cochlear implant in place    Disequilibrium    Sensorineural hearing loss, bilateral    Thrombocytopenia (CMS-HCC)    Medicare annual wellness visit, subsequent    Periumbilical mass   [3]   No current outpatient medications on file.     No current facility-administered medications for this visit.   [4]   Past Medical History:  Diagnosis Date    Abdominal distension (gaseous) 07/22/2016    Abdominal bloating    Flatulence 07/22/2016    Excessive flatus    HL (hearing loss) At birth    Migraine with aura, not intractable, without status migrainosus 04/19/2017    Ophthalmic migraine    Other specified symptoms and signs involving the digestive system and abdomen 07/22/2016    Change in bowel function    Personal history of other diseases of male genital organs     History of hydrocele    Personal history of other diseases of the musculoskeletal system and connective tissue     History of arthritis    Personal history of other diseases of the nervous system and sense organs 02/14/2017    History of migraine with aura    Transient cerebral ischemic attack, unspecified 04/19/2017    TIA (transient ischemic attack)    Unspecified visual disturbance 04/19/2017    Vision changes   [5]   Past Surgical History:  Procedure Laterality Date    CT ANGIO NECK  12/14/2016    CT NECK ANGIO W AND WO IV CONTRAST 12/14/2016 [facility] ANCILLARY LEGACY    CT HEAD ANGIO W AND WO IV CONTRAST  12/14/2016    CT  HEAD ANGIO W AND WO IV CONTRAST 12/14/2016 [facility] ANCILLARY LEGACY    FRACTURE SURGERY  Broken left collarbone    JOINT REPLACEMENT  Right knee replacement    OTHER SURGICAL HISTORY  08/26/2014    Closed Treatment Of Clavicular Fracture    OTHER SURGICAL HISTORY  08/26/2014    Inner Ear Surgery Cochlear Device Implantation    OTHER SURGICAL HISTORY  11/12/2018    Cochlear implant surgery    OTHER SURGICAL HISTORY  11/12/2018    Knee arthroscopy   [6]   Family History  Problem Relation Name Age of Onset    Diabetes Mother Melissa Farr    [7]   Social History  Tobacco Use    Smoking status: Never     Passive exposure: Never    Smokeless tobacco: Never   Substance Use Topics    Alcohol use: Yes     Alcohol/week: 2.0 standard drinks of alcohol     Types: 1 Glasses of wine, 1 Cans of beer per week    Drug use: Never   [8] No Known Allergies

## 2025-04-18 ENCOUNTER — PREP FOR PROCEDURE (OUTPATIENT)
Dept: UROLOGY | Facility: HOSPITAL | Age: 70
End: 2025-04-18

## 2025-04-18 ENCOUNTER — OFFICE VISIT (OUTPATIENT)
Dept: UROLOGY | Facility: HOSPITAL | Age: 70
End: 2025-04-18
Payer: MEDICARE

## 2025-04-18 DIAGNOSIS — N40.1 BENIGN PROSTATIC HYPERPLASIA WITH URINARY RETENTION: Primary | ICD-10-CM

## 2025-04-18 DIAGNOSIS — N40.1 ENLARGED PROSTATE WITH URINARY RETENTION: Primary | ICD-10-CM

## 2025-04-18 DIAGNOSIS — N32.3 DIVERTICULUM OF BLADDER: ICD-10-CM

## 2025-04-18 DIAGNOSIS — N32.0 BLADDER OUTLET OBSTRUCTION: ICD-10-CM

## 2025-04-18 DIAGNOSIS — R33.8 BENIGN PROSTATIC HYPERPLASIA WITH URINARY RETENTION: Primary | ICD-10-CM

## 2025-04-18 DIAGNOSIS — R33.8 ENLARGED PROSTATE WITH URINARY RETENTION: Primary | ICD-10-CM

## 2025-04-18 PROCEDURE — G2211 COMPLEX E/M VISIT ADD ON: HCPCS | Performed by: UROLOGY

## 2025-04-18 PROCEDURE — 99214 OFFICE O/P EST MOD 30 MIN: CPT | Performed by: UROLOGY

## 2025-04-18 PROCEDURE — 99204 OFFICE O/P NEW MOD 45 MIN: CPT | Performed by: UROLOGY

## 2025-04-18 PROCEDURE — 1123F ACP DISCUSS/DSCN MKR DOCD: CPT | Performed by: UROLOGY

## 2025-04-18 RX ORDER — CEFAZOLIN SODIUM 2 G/100ML
2 INJECTION, SOLUTION INTRAVENOUS ONCE
OUTPATIENT
Start: 2025-04-18 | End: 2025-04-18

## 2025-04-19 LAB
ANION GAP SERPL CALCULATED.4IONS-SCNC: 9 MMOL/L (CALC) (ref 7–17)
BUN SERPL-MCNC: 22 MG/DL (ref 7–25)
BUN/CREAT SERPL: NORMAL (CALC) (ref 6–22)
CALCIUM SERPL-MCNC: 9.4 MG/DL (ref 8.6–10.3)
CHLORIDE SERPL-SCNC: 102 MMOL/L (ref 98–110)
CO2 SERPL-SCNC: 28 MMOL/L (ref 20–32)
CREAT SERPL-MCNC: 0.98 MG/DL (ref 0.7–1.35)
EGFRCR SERPLBLD CKD-EPI 2021: 84 ML/MIN/1.73M2
GLUCOSE SERPL-MCNC: 86 MG/DL (ref 65–99)
POTASSIUM SERPL-SCNC: 4.5 MMOL/L (ref 3.5–5.3)
SODIUM SERPL-SCNC: 139 MMOL/L (ref 135–146)

## 2025-05-01 ENCOUNTER — TELEPHONE (OUTPATIENT)
Dept: UROLOGY | Facility: HOSPITAL | Age: 70
End: 2025-05-01
Payer: MEDICARE

## 2025-05-06 ENCOUNTER — TELEPHONE (OUTPATIENT)
Dept: PREADMISSION TESTING | Facility: HOSPITAL | Age: 70
End: 2025-05-06
Payer: MEDICARE

## 2025-05-07 ENCOUNTER — LAB (OUTPATIENT)
Dept: LAB | Facility: HOSPITAL | Age: 70
End: 2025-05-07
Payer: MEDICARE

## 2025-05-07 ENCOUNTER — PRE-ADMISSION TESTING (OUTPATIENT)
Dept: PREADMISSION TESTING | Facility: HOSPITAL | Age: 70
End: 2025-05-07
Payer: MEDICARE

## 2025-05-07 VITALS
BODY MASS INDEX: 23.67 KG/M2 | OXYGEN SATURATION: 98 % | SYSTOLIC BLOOD PRESSURE: 112 MMHG | DIASTOLIC BLOOD PRESSURE: 67 MMHG | HEART RATE: 76 BPM | TEMPERATURE: 97 F | HEIGHT: 73 IN | WEIGHT: 178.57 LBS | RESPIRATION RATE: 16 BRPM

## 2025-05-07 DIAGNOSIS — N40.1 BENIGN PROSTATIC HYPERPLASIA WITH LOWER URINARY TRACT SYMPTOMS: Primary | ICD-10-CM

## 2025-05-07 DIAGNOSIS — R33.8 OTHER RETENTION OF URINE: ICD-10-CM

## 2025-05-07 DIAGNOSIS — R33.8 BENIGN PROSTATIC HYPERPLASIA WITH URINARY RETENTION: Primary | ICD-10-CM

## 2025-05-07 DIAGNOSIS — N40.1 BENIGN PROSTATIC HYPERPLASIA WITH URINARY RETENTION: Primary | ICD-10-CM

## 2025-05-07 LAB
ANION GAP SERPL CALC-SCNC: 11 MMOL/L (ref 10–20)
APPEARANCE UR: CLEAR
BILIRUB UR STRIP.AUTO-MCNC: NEGATIVE MG/DL
BUN SERPL-MCNC: 18 MG/DL (ref 6–23)
CALCIUM SERPL-MCNC: 8.8 MG/DL (ref 8.6–10.3)
CHLORIDE SERPL-SCNC: 105 MMOL/L (ref 98–107)
CO2 SERPL-SCNC: 29 MMOL/L (ref 21–32)
COLOR UR: NORMAL
CREAT SERPL-MCNC: 0.99 MG/DL (ref 0.5–1.3)
EGFRCR SERPLBLD CKD-EPI 2021: 83 ML/MIN/1.73M*2
ERYTHROCYTE [DISTWIDTH] IN BLOOD BY AUTOMATED COUNT: 13.1 % (ref 11.5–14.5)
GLUCOSE SERPL-MCNC: 121 MG/DL (ref 74–99)
GLUCOSE UR STRIP.AUTO-MCNC: NORMAL MG/DL
HCT VFR BLD AUTO: 44.3 % (ref 41–52)
HGB BLD-MCNC: 14.9 G/DL (ref 13.5–17.5)
KETONES UR STRIP.AUTO-MCNC: NEGATIVE MG/DL
LEUKOCYTE ESTERASE UR QL STRIP.AUTO: NEGATIVE
MCH RBC QN AUTO: 29.3 PG (ref 26–34)
MCHC RBC AUTO-ENTMCNC: 33.6 G/DL (ref 32–36)
MCV RBC AUTO: 87 FL (ref 80–100)
NITRITE UR QL STRIP.AUTO: NEGATIVE
NRBC BLD-RTO: 0 /100 WBCS (ref 0–0)
PH UR STRIP.AUTO: 6 [PH]
PLATELET # BLD AUTO: 141 X10*3/UL (ref 150–450)
POTASSIUM SERPL-SCNC: 4.1 MMOL/L (ref 3.5–5.3)
PROT UR STRIP.AUTO-MCNC: NEGATIVE MG/DL
RBC # BLD AUTO: 5.08 X10*6/UL (ref 4.5–5.9)
RBC # UR STRIP.AUTO: NEGATIVE MG/DL
SODIUM SERPL-SCNC: 141 MMOL/L (ref 136–145)
SP GR UR STRIP.AUTO: 1.02
UROBILINOGEN UR STRIP.AUTO-MCNC: NORMAL MG/DL
WBC # BLD AUTO: 6.6 X10*3/UL (ref 4.4–11.3)

## 2025-05-07 PROCEDURE — 81003 URINALYSIS AUTO W/O SCOPE: CPT

## 2025-05-07 PROCEDURE — 80048 BASIC METABOLIC PNL TOTAL CA: CPT

## 2025-05-07 PROCEDURE — 85027 COMPLETE CBC AUTOMATED: CPT

## 2025-05-07 PROCEDURE — 99204 OFFICE O/P NEW MOD 45 MIN: CPT

## 2025-05-07 ASSESSMENT — ENCOUNTER SYMPTOMS
ENDOCRINE NEGATIVE: 1
MUSCULOSKELETAL NEGATIVE: 1
CARDIOVASCULAR NEGATIVE: 1
CONSTITUTIONAL NEGATIVE: 1
GASTROINTESTINAL NEGATIVE: 1
NECK NEGATIVE: 1
EYES NEGATIVE: 1
RESPIRATORY NEGATIVE: 1
NEUROLOGICAL NEGATIVE: 1

## 2025-05-07 NOTE — CPM/PAT H&P
[facility] Evaluation       Name: Jorge Farr (Jorge Farr)  /Age: 1955/69 y.o.     In-Person       Chief Complaint: BPH    HPI      Date of Consult: 25    Referring Provider:  Dr. Kavon Hines    Date, Surgery, and Length:  25, Anatomical Endoscopic Prostate Enucleation ~ HoLEP, 115 minutes      Patient presents to [facility] for perioperative risk assessment prior to scheduled surgery. Pt with marked prostatic enlargement with an estimated volume of 231g, PVR 1400cc. Reports some abdominal discomfort resolved with voiding. No significant symptoms.        This note was created in part upon personal review of patient's medical records.        Pt denies any past history of anesthetic complications such as PONV, awareness, prolonged sedation, dental damage, aspiration, cardiac arrest, difficult intubation, difficult I.V. access or unexpected hospital admissions. No history of malignant hyperthermia and or pseudocholinesterase deficiency.    No history of blood transfusions.    The patient IS NOT a Denominational and will accept blood and blood products if medically indicated.     Type and screen WAS NOT sent.    Past Medical History:   Diagnosis Date    Abdominal distension (gaseous) 2016    Abdominal bloating    Flatulence 2016    Excessive flatus    HL (hearing loss) At birth    Migraine with aura, not intractable, without status migrainosus 2017    Ophthalmic migraine    Other specified symptoms and signs involving the digestive system and abdomen 2016    Change in bowel function    Personal history of other diseases of male genital organs     History of hydrocele    Personal history of other diseases of the musculoskeletal system and connective tissue     History of arthritis    Personal history of other diseases of the nervous system and sense organs 2017    History of migraine with aura    Transient cerebral ischemic attack, unspecified 2017    TIA  (transient ischemic attack)    Unspecified visual disturbance 04/19/2017    Vision changes       Past Surgical History:   Procedure Laterality Date    CT ANGIO NECK  12/14/2016    CT NECK ANGIO W AND WO IV CONTRAST 12/14/2016 [facility] ANCILLARY LEGACY    CT HEAD ANGIO W AND WO IV CONTRAST  12/14/2016    CT HEAD ANGIO W AND WO IV CONTRAST 12/14/2016 [facility] ANCILLARY LEGACY    FRACTURE SURGERY  Broken left collarbone    JOINT REPLACEMENT  Right knee replacement    OTHER SURGICAL HISTORY  08/26/2014    Closed Treatment Of Clavicular Fracture    OTHER SURGICAL HISTORY  08/26/2014    Inner Ear Surgery Cochlear Device Implantation    OTHER SURGICAL HISTORY  11/12/2018    Cochlear implant surgery    OTHER SURGICAL HISTORY  11/12/2018    Knee arthroscopy       Family History   Problem Relation Name Age of Onset    Diabetes Mother Melissa Yordan      Social History     Tobacco Use   Smoking Status Never    Passive exposure: Never   Smokeless Tobacco Never       Social History     Substance and Sexual Activity   Alcohol Use Yes    Alcohol/week: 2.0 standard drinks of alcohol    Types: 1 Glasses of wine, 1 Cans of beer per week     Social History     Substance and Sexual Activity   Drug Use Never     No Known Allergies    No current outpatient medications       PAT ROS:   Constitutional:   neg    Neuro/Psych:   neg    Eyes:   neg    Ears:    Left cochlear implant  Nose:   neg    Mouth:   neg    Throat:   neg    Neck:   neg    Cardio:   neg    Respiratory:   neg    Endocrine:   neg    GI:   neg    :    Urinary hesitancy  Musculoskeletal:   neg    Hematologic:   neg    Skin:  neg        Physical Exam  Vitals reviewed.   Constitutional:       Appearance: Normal appearance.   Cardiovascular:      Rate and Rhythm: Normal rate and regular rhythm.      Comments: No murmurs, rubs or gallops  Pulmonary:      Effort: Pulmonary effort is normal.      Breath sounds: Normal breath sounds.   Abdominal:      Comments: Mild distention lower abdomen,  "non-tender to palpation   Musculoskeletal:      Cervical back: Normal range of motion.   Neurological:      Mental Status: He is alert.          PAT AIRWAY:   Airway:     Mallampati::  II    Neck ROM::  Full  normal          Visit Vitals  /67   Pulse 76   Temp 36.1 °C (97 °F)   Resp 16   Ht 1.85 m (6' 0.84\")   Wt 81 kg (178 lb 9.2 oz)   SpO2 98%   BMI 23.67 kg/m²   Smoking Status Never   BSA 2.04 m²         Patient is a 69 y.o.  male scheduled for HoLEP with Dr. Hines on 5/20/25.      Plan      Neuro:  No neurologic diagnosis or significant findings on chart review, clinical presentation and evaluation.  No grossly apparent neurologic perioperative risk.  Patient is not at increased risk for perioperative CVA      Cardiovascular:    RCRI: 0 Risk of Mace: 0.4%    Caprini: 3    Patient denies any chest pain, tightness, heaviness, pressure, radiating pain, palpitations, irregular heartbeats, lightheadedness, cough, congestion, shortness of breath, CONTRERAS, PND, near syncope, weight loss or gain.    Good functional capacity (>4 METS)      EKG in PAT not indicated.      Pulmonary:  No pulmonary diagnosis or significant findings on chart review or clinical presentation.  No further preoperative testing is indicated at this time.  Stop Bang score is 4 placing patient at high risk for CHAVO  ARISCAT: <26 points, 1.6% risk of in-hospital postoperative pulmonary complication  PRODIGY: High risk for opioid induced respiratory depression  Pumonary toilet education discussed, patient also provided deep breathing exercises and incentive spirometry educational handout      GI/:  BPH- asymptomatic.    Heme:  Patient instructed to ambulate as soon as possible postoperatively to decrease thromboembolic risk.    Initiate mechanical DVT prophylaxis as soon as possible and initiate chemical prophylaxis when deemed safe from a bleeding standpoint post surgery.        Risk assessment complete.  This patient is LOW risk candidate " undergoing LOW risk procedure, patient is medically optimized for surgery.        Labs/testing obtained in PAT on 5/7/25:  CBC, BMP, UA w/reflex    Lab Results   Component Value Date    WBC 6.6 05/07/2025    HGB 14.9 05/07/2025    HCT 44.3 05/07/2025    MCV 87 05/07/2025     (L) 05/07/2025     Lab Results   Component Value Date    GLUCOSE 121 (H) 05/07/2025    CALCIUM 8.8 05/07/2025     05/07/2025    K 4.1 05/07/2025    CO2 29 05/07/2025     05/07/2025    BUN 18 05/07/2025    CREATININE 0.99 05/07/2025     Urinalysis with Reflex Culture and Microscopic  Order: [id number] - Part of Panel Order [id number]   Status: Final result       Dx: Benign prostatic hyperplasia with uri...    Test Result Released: Yes (not seen)    0 Result Notes       Component  Ref Range & Units 13:16 4 yr ago   Color, Urine  Light-Yellow, Yellow, Dark-Yellow Light-Yellow YELLOW R   Appearance, Urine  Clear Clear CLEAR R   Specific Gravity, Urine  1.005 - 1.035 1.016 1.025 R   pH, Urine  5.0, 5.5, 6.0, 6.5, 7.0, 7.5, 8.0 6.0 6.5 R   Protein, Urine  NEGATIVE, 10 (TRACE), 20 (TRACE) mg/dL NEGATIVE NEGATIVE R   Glucose, Urine  Normal mg/dL Normal NEGATIVE R   Blood, Urine  NEGATIVE mg/dL NEGATIVE NEGATIVE R   Ketones, Urine  NEGATIVE mg/dL NEGATIVE NEGATIVE R   Bilirubin, Urine  NEGATIVE mg/dL NEGATIVE NEGATIVE R   Urobilinogen, Urine  Normal mg/dL Normal 0.2 R   Nitrite, Urine  NEGATIVE NEGATIVE NEGATIVE R   Leukocyte Esterase, Urine  NEGATIVE NEGATIVE NEGATIVE R   Resulting Agency [facility]             Specimen Collected: 05/07/25 13:16     Follow up/communication: None      Preoperative medication instructions were provided and reviewed with the patient.  Any additional testing or evaluation was explained to the patient.  Nothing by mouth instructions were discussed and patient's questions were answered prior to conclusion to this encounter.  Patient verbalized understanding of preoperative instructions given in preadmission  testing; discharge instructions available in EMR.    This note was dictated with speech recognition.  Minor errors may have been detected during use of speech recognition.

## 2025-05-07 NOTE — H&P (VIEW-ONLY)
[facility] Evaluation       Name: Jorge Farr (Jorge Farr)  /Age: 1955/69 y.o.     In-Person       Chief Complaint: BPH    HPI      Date of Consult: 25    Referring Provider:  Dr. Kavon Hines    Date, Surgery, and Length:  25, Anatomical Endoscopic Prostate Enucleation ~ HoLEP, 115 minutes      Patient presents to [facility] for perioperative risk assessment prior to scheduled surgery. Pt with marked prostatic enlargement with an estimated volume of 231g, PVR 1400cc. Reports some abdominal discomfort resolved with voiding. No significant symptoms.        This note was created in part upon personal review of patient's medical records.        Pt denies any past history of anesthetic complications such as PONV, awareness, prolonged sedation, dental damage, aspiration, cardiac arrest, difficult intubation, difficult I.V. access or unexpected hospital admissions. No history of malignant hyperthermia and or pseudocholinesterase deficiency.    No history of blood transfusions.    The patient IS NOT a Sikh and will accept blood and blood products if medically indicated.     Type and screen WAS NOT sent.    Past Medical History:   Diagnosis Date    Abdominal distension (gaseous) 2016    Abdominal bloating    Flatulence 2016    Excessive flatus    HL (hearing loss) At birth    Migraine with aura, not intractable, without status migrainosus 2017    Ophthalmic migraine    Other specified symptoms and signs involving the digestive system and abdomen 2016    Change in bowel function    Personal history of other diseases of male genital organs     History of hydrocele    Personal history of other diseases of the musculoskeletal system and connective tissue     History of arthritis    Personal history of other diseases of the nervous system and sense organs 2017    History of migraine with aura    Transient cerebral ischemic attack, unspecified 2017    TIA  (transient ischemic attack)    Unspecified visual disturbance 04/19/2017    Vision changes       Past Surgical History:   Procedure Laterality Date    CT ANGIO NECK  12/14/2016    CT NECK ANGIO W AND WO IV CONTRAST 12/14/2016 [facility] ANCILLARY LEGACY    CT HEAD ANGIO W AND WO IV CONTRAST  12/14/2016    CT HEAD ANGIO W AND WO IV CONTRAST 12/14/2016 [facility] [place] ANCILLARY LEGACY    FRACTURE SURGERY  Broken left collarbone    JOINT REPLACEMENT  Right knee replacement    OTHER SURGICAL HISTORY  08/26/2014    Closed Treatment Of Clavicular Fracture    OTHER SURGICAL HISTORY  08/26/2014    Inner Ear Surgery Cochlear Device Implantation    OTHER SURGICAL HISTORY  11/12/2018    Cochlear implant surgery    OTHER SURGICAL HISTORY  11/12/2018    Knee arthroscopy       Family History   Problem Relation Name Age of Onset    Diabetes Mother Melissa Yordan      Social History     Tobacco Use   Smoking Status Never    Passive exposure: Never   Smokeless Tobacco Never       Social History     Substance and Sexual Activity   Alcohol Use Yes    Alcohol/week: 2.0 standard drinks of alcohol    Types: 1 Glasses of wine, 1 Cans of beer per week     Social History     Substance and Sexual Activity   Drug Use Never     No Known Allergies    No current outpatient medications       PAT ROS:   Constitutional:   neg    Neuro/Psych:   neg    Eyes:   neg    Ears:    Left cochlear implant  Nose:   neg    Mouth:   neg    Throat:   neg    Neck:   neg    Cardio:   neg    Respiratory:   neg    Endocrine:   neg    GI:   neg    :    Urinary hesitancy  Musculoskeletal:   neg    Hematologic:   neg    Skin:  neg        Physical Exam  Vitals reviewed.   Constitutional:       Appearance: Normal appearance.   Cardiovascular:      Rate and Rhythm: Normal rate and regular rhythm.      Comments: No murmurs, rubs or gallops  Pulmonary:      Effort: Pulmonary effort is normal.      Breath sounds: Normal breath sounds.   Abdominal:      Comments: Mild distention lower abdomen,  "non-tender to palpation   Musculoskeletal:      Cervical back: Normal range of motion.   Neurological:      Mental Status: He is alert.          PAT AIRWAY:   Airway:     Mallampati::  II    Neck ROM::  Full  normal          Visit Vitals  /67   Pulse 76   Temp 36.1 °C (97 °F)   Resp 16   Ht 1.85 m (6' 0.84\")   Wt 81 kg (178 lb 9.2 oz)   SpO2 98%   BMI 23.67 kg/m²   Smoking Status Never   BSA 2.04 m²         Patient is a 69 y.o.  male scheduled for HoLEP with Dr. Hines on 5/20/25.      Plan      Neuro:  No neurologic diagnosis or significant findings on chart review, clinical presentation and evaluation.  No grossly apparent neurologic perioperative risk.  Patient is not at increased risk for perioperative CVA      Cardiovascular:    RCRI: 0 Risk of Mace: 0.4%    Caprini: 3    Patient denies any chest pain, tightness, heaviness, pressure, radiating pain, palpitations, irregular heartbeats, lightheadedness, cough, congestion, shortness of breath, CONTRERAS, PND, near syncope, weight loss or gain.    Good functional capacity (>4 METS)      EKG in PAT not indicated.      Pulmonary:  No pulmonary diagnosis or significant findings on chart review or clinical presentation.  No further preoperative testing is indicated at this time.  Stop Bang score is 4 placing patient at high risk for CHAVO  ARISCAT: <26 points, 1.6% risk of in-hospital postoperative pulmonary complication  PRODIGY: High risk for opioid induced respiratory depression  Pumonary toilet education discussed, patient also provided deep breathing exercises and incentive spirometry educational handout      GI/:  BPH- asymptomatic.    Heme:  Patient instructed to ambulate as soon as possible postoperatively to decrease thromboembolic risk.    Initiate mechanical DVT prophylaxis as soon as possible and initiate chemical prophylaxis when deemed safe from a bleeding standpoint post surgery.        Risk assessment complete.  This patient is LOW risk candidate " undergoing LOW risk procedure, patient is medically optimized for surgery.        Labs/testing obtained in PAT on 5/7/25:  CBC, BMP, UA w/reflex    Lab Results   Component Value Date    WBC 6.6 05/07/2025    HGB 14.9 05/07/2025    HCT 44.3 05/07/2025    MCV 87 05/07/2025     (L) 05/07/2025     Lab Results   Component Value Date    GLUCOSE 121 (H) 05/07/2025    CALCIUM 8.8 05/07/2025     05/07/2025    K 4.1 05/07/2025    CO2 29 05/07/2025     05/07/2025    BUN 18 05/07/2025    CREATININE 0.99 05/07/2025     Urinalysis with Reflex Culture and Microscopic  Order: [id number] - Part of Panel Order [id number]   Status: Final result       Dx: Benign prostatic hyperplasia with uri...    Test Result Released: Yes (not seen)    0 Result Notes       Component  Ref Range & Units 13:16 4 yr ago   Color, Urine  Light-Yellow, Yellow, Dark-Yellow Light-Yellow YELLOW R   Appearance, Urine  Clear Clear CLEAR R   Specific Gravity, Urine  1.005 - 1.035 1.016 1.025 R   pH, Urine  5.0, 5.5, 6.0, 6.5, 7.0, 7.5, 8.0 6.0 6.5 R   Protein, Urine  NEGATIVE, 10 (TRACE), 20 (TRACE) mg/dL NEGATIVE NEGATIVE R   Glucose, Urine  Normal mg/dL Normal NEGATIVE R   Blood, Urine  NEGATIVE mg/dL NEGATIVE NEGATIVE R   Ketones, Urine  NEGATIVE mg/dL NEGATIVE NEGATIVE R   Bilirubin, Urine  NEGATIVE mg/dL NEGATIVE NEGATIVE R   Urobilinogen, Urine  Normal mg/dL Normal 0.2 R   Nitrite, Urine  NEGATIVE NEGATIVE NEGATIVE R   Leukocyte Esterase, Urine  NEGATIVE NEGATIVE NEGATIVE R   Resulting Agency [facility]             Specimen Collected: 05/07/25 13:16     Follow up/communication: None      Preoperative medication instructions were provided and reviewed with the patient.  Any additional testing or evaluation was explained to the patient.  Nothing by mouth instructions were discussed and patient's questions were answered prior to conclusion to this encounter.  Patient verbalized understanding of preoperative instructions given in preadmission  testing; discharge instructions available in EMR.    This note was dictated with speech recognition.  Minor errors may have been detected during use of speech recognition.

## 2025-05-07 NOTE — CPM/PAT NURSE NOTE
CPM/PAT Nurse Note      Name: Jorge Farr (Jorge Farr)  /Age: 1955/ y.o.       Medical History[1]    Surgical History[2]    Patient  reports that he is not currently sexually active and has had partner(s) who are female. He reports using the following method of birth control/protection: None.    Family History[3]    Allergies[4]    Prior to Admission medications    Not on File        PAT ROS     DASI Risk Score    No data to display       Caprini DVT Assessment    No data to display       Modified Frailty Index    No data to display       [id number] Stroke Risk Points  Current as of 2 minutes ago        N/A 0 to 9 Points:      Last Change: N/A          The [id number] risk score (Lip LESLIE, et al. 2009. © 2010 [employer]) quantifies the risk of stroke for a patient with atrial fibrillation. For patients without atrial fibrillation or under the age of 18 this score appears as N/A. Higher score values generally indicate higher risk of stroke.        This score is not applicable to this patient. Components are not calculated.          Revised Cardiac Risk Index    No data to display       Apfel Simplified Score    No data to display       Risk Analysis Index Results This Encounter    No data found in the last 10 encounters.       Prodigy: High Risk  Total Score: 16              Prodigy Age Score      Prodigy Gender Score          ARISCAT Score for Postoperative Pulmonary Complications    No data to display       Harper Perioperative Risk for Myocardial Infarction or Cardiac Arrest (DENISSE)    No data to display         Nurse Plan of Action:     After Visit Summary (AVS) reviewed and patient verbalized good understanding of medications and NPO instructions.              [1]   Past Medical History:  Diagnosis Date    Abdominal distension (gaseous) 2016    Abdominal bloating    Flatulence 2016    Excessive flatus    HL (hearing loss) At birth    Migraine with aura, not  intractable, without status migrainosus 04/19/2017    Ophthalmic migraine    Other specified symptoms and signs involving the digestive system and abdomen 07/22/2016    Change in bowel function    Personal history of other diseases of male genital organs     History of hydrocele    Personal history of other diseases of the musculoskeletal system and connective tissue     History of arthritis    Personal history of other diseases of the nervous system and sense organs 02/14/2017    History of migraine with aura    Transient cerebral ischemic attack, unspecified 04/19/2017    TIA (transient ischemic attack)    Unspecified visual disturbance 04/19/2017    Vision changes   [2]   Past Surgical History:  Procedure Laterality Date    CT ANGIO NECK  12/14/2016    CT NECK ANGIO W AND WO IV CONTRAST 12/14/2016 [facility]    CT HEAD ANGIO W AND WO IV CONTRAST  12/14/2016    CT HEAD ANGIO W AND WO IV CONTRAST 12/14/2016 [facility]    FRACTURE SURGERY  Broken left collarbone    JOINT REPLACEMENT  Right knee replacement    OTHER SURGICAL HISTORY  08/26/2014    Closed Treatment Of Clavicular Fracture    OTHER SURGICAL HISTORY  08/26/2014    Inner Ear Surgery Cochlear Device Implantation    OTHER SURGICAL HISTORY  11/12/2018    Cochlear implant surgery    OTHER SURGICAL HISTORY  11/12/2018    Knee arthroscopy   [3]   Family History  Problem Relation Name Age of Onset    Diabetes Mother Melissa Farr    [4] No Known Allergies

## 2025-05-07 NOTE — PREPROCEDURE INSTRUCTIONS
Medication List      as of May 7, 2025  1:11 PM     You have not been prescribed any medications.                     Preoperative Deep Breathing Exercises  Why it is important to do deep breathing exercises before my surgery?  Deep breathing exercises strengthen your breathing muscles.  This helps you to recover after your surgery and decreases the chance of breathing complications.  How are the deep breathing exercises done?  Sit straight with your back supported.  Breathe in deeply and slowly through your nose. Your lower rib cage should expand and your abdomen may move forward.  Hold that breath for 3 to 5 seconds.  Breathe out through pursed lips, slowly and completely.  Rest and repeat 10 times every hour while awake.  Rest longer if you become dizzy or lightheaded.        CONTACT SURGEON'S OFFICE IF YOU DEVELOP:  * Fever = 100.4 F   * New respiratory symptoms (e.g. cough, shortness of breath, respiratory distress, sore throat)  * Recent loss of taste or smell  *Flu like symptoms such as headache, fatigue or gastrointestinal symptoms  * You develop any open sores, shingles, burning or painful urination   AND/OR:  * You no longer wish to have the surgery.  * Any other personal circumstances change that may lead to the need to cancel or defer this surgery.  *You were admitted to any hospital within one week of your planned procedure.    SMOKING:  *Quitting smoking can make a huge difference to your health and recovery from surgery.    *If you need help with quitting, call 6-977-QUIT-NOW.    THE DAY OF SURGERY:  *Do not eat any food after midnight the night before your surgery.   *YOU MUST drink 14 OUNCES of clear liquids TWO hours before your instructed ARRIVAL TIME to the hospital. This includes water, black tea/coffee (no milk or cream), apple juice, clear broth and electrolyte drinks (Gatorade).  Please avoid clear liquids that are red in color.   *You may chew gum/mints up to TWO hours before your  surgery/procedure.    SURGICAL TIME:  *You will be contacted between 2 p.m. and 6 p.m. the business day before your surgery with your arrival time.  *If you haven't received a call by 6pm, call [phone].  *Scheduled surgery times may change and you will be notified if this occurs-check your personal voicemail for any updates.    ON THE MORNING OF SURGERY:  *Wear comfortable, loose fitting clothing.   *Do not use moisturizers, creams, lotions or perfume.  *All jewelry and valuables should be left at home.  *Prosthetic devices such as contact lenses, hearing aids, dentures, eyelash extensions, hairpins and body piercing must be removed before surgery.    BRING WITH YOU:  *Photo ID and insurance card  *Current list of medications and allergies  *Pacemaker/Defibrillator/Heart stent cards  *CPAP machine and mask  *Slings/splints/crutches  *Copy of your complete Advanced Directive/DHPOA-if applicable  *Neurostimulator implant remote    PARKING AND ARRIVAL:  *Check in at the Main Entrance desk and let them know you are here for surgery.  *You will be directed to the 2nd floor surgical waiting area.    IF YOU ARE HAVING OUTPATIENT/SAME DAY SURGERY:  *A responsible adult MUST accompany you at the time of discharge and stay with you for 24 hours after your surgery.  *You may NOT drive yourself home after surgery.  *You may use a taxi or ride sharing service ([employer], Uber) to return home ONLY if you are accompanied by a friend or family member.  *Instructions for resuming your medications will be provided by your surgeon.      Preoperative Brain Exercises    What are brain exercises?  A brain exercise is any activity that engages your thinking (cognitive) skills.    What types of activities are considered brain exercises?  Jigsaw puzzles, crossword puzzles, word jumble, memory games, word search, and many more.  Many can be found free online or on your phone via a mobile lorie.    Why should I do brain exercises before my  surgery?  More recent research has shown brain exercise before surgery can lower the risk of postoperative delirium (confusion) which can be especially important for older adults.  Patients who did brain exercises for 5 to 10 hours (total) for the 7-10 days before surgery, cut their risk of postoperative delirium in half up to 1 week after surgery.

## 2025-05-20 ENCOUNTER — ANESTHESIA EVENT (OUTPATIENT)
Dept: OPERATING ROOM | Facility: HOSPITAL | Age: 70
End: 2025-05-20
Payer: MEDICARE

## 2025-05-20 ENCOUNTER — ANESTHESIA (OUTPATIENT)
Dept: OPERATING ROOM | Facility: HOSPITAL | Age: 70
End: 2025-05-20
Payer: MEDICARE

## 2025-05-20 ENCOUNTER — HOSPITAL ENCOUNTER (OUTPATIENT)
Facility: HOSPITAL | Age: 70
Setting detail: OUTPATIENT SURGERY
Discharge: HOME | End: 2025-05-20
Attending: UROLOGY | Admitting: UROLOGY
Payer: MEDICARE

## 2025-05-20 VITALS
HEART RATE: 55 BPM | TEMPERATURE: 97.2 F | DIASTOLIC BLOOD PRESSURE: 82 MMHG | RESPIRATION RATE: 16 BRPM | WEIGHT: 173.94 LBS | BODY MASS INDEX: 23.05 KG/M2 | HEIGHT: 73 IN | SYSTOLIC BLOOD PRESSURE: 120 MMHG | OXYGEN SATURATION: 99 %

## 2025-05-20 DIAGNOSIS — N40.1 ENLARGED PROSTATE WITH URINARY RETENTION: Primary | ICD-10-CM

## 2025-05-20 DIAGNOSIS — R33.8 ENLARGED PROSTATE WITH URINARY RETENTION: Primary | ICD-10-CM

## 2025-05-20 PROCEDURE — C1889 IMPLANT/INSERT DEVICE, NOC: HCPCS | Performed by: UROLOGY

## 2025-05-20 PROCEDURE — 7100000001 HC RECOVERY ROOM TIME - INITIAL BASE CHARGE: Performed by: UROLOGY

## 2025-05-20 PROCEDURE — 3600000009 HC OR TIME - EACH INCREMENTAL 1 MINUTE - PROCEDURE LEVEL FOUR: Performed by: UROLOGY

## 2025-05-20 PROCEDURE — 2720000007 HC OR 272 NO HCPCS: Performed by: UROLOGY

## 2025-05-20 PROCEDURE — 3700000002 HC GENERAL ANESTHESIA TIME - EACH INCREMENTAL 1 MINUTE: Performed by: UROLOGY

## 2025-05-20 PROCEDURE — 52649 PROSTATE LASER ENUCLEATION: CPT | Performed by: UROLOGY

## 2025-05-20 PROCEDURE — 3600000004 HC OR TIME - INITIAL BASE CHARGE - PROCEDURE LEVEL FOUR: Performed by: UROLOGY

## 2025-05-20 PROCEDURE — 2500000001 HC RX 250 WO HCPCS SELF ADMINISTERED DRUGS (ALT 637 FOR MEDICARE OP): Performed by: ANESTHESIOLOGY

## 2025-05-20 PROCEDURE — 2500000004 HC RX 250 GENERAL PHARMACY W/ HCPCS (ALT 636 FOR OP/ED): Mod: JZ | Performed by: NURSE ANESTHETIST, CERTIFIED REGISTERED

## 2025-05-20 PROCEDURE — 7100000009 HC PHASE TWO TIME - INITIAL BASE CHARGE: Performed by: UROLOGY

## 2025-05-20 PROCEDURE — 7100000010 HC PHASE TWO TIME - EACH INCREMENTAL 1 MINUTE: Performed by: UROLOGY

## 2025-05-20 PROCEDURE — 88305 TISSUE EXAM BY PATHOLOGIST: CPT | Mod: TC,AHULAB | Performed by: UROLOGY

## 2025-05-20 PROCEDURE — 88307 TISSUE EXAM BY PATHOLOGIST: CPT | Performed by: PATHOLOGY

## 2025-05-20 PROCEDURE — C1758 CATHETER, URETERAL: HCPCS | Performed by: UROLOGY

## 2025-05-20 PROCEDURE — 7100000002 HC RECOVERY ROOM TIME - EACH INCREMENTAL 1 MINUTE: Performed by: UROLOGY

## 2025-05-20 PROCEDURE — 2500000005 HC RX 250 GENERAL PHARMACY W/O HCPCS: Mod: JZ | Performed by: UROLOGY

## 2025-05-20 PROCEDURE — 52204 CYSTOSCOPY W/BIOPSY(S): CPT | Performed by: UROLOGY

## 2025-05-20 PROCEDURE — 88305 TISSUE EXAM BY PATHOLOGIST: CPT | Performed by: PATHOLOGY

## 2025-05-20 PROCEDURE — 3700000001 HC GENERAL ANESTHESIA TIME - INITIAL BASE CHARGE: Performed by: UROLOGY

## 2025-05-20 RX ORDER — CEFAZOLIN 1 G/1
INJECTION, POWDER, FOR SOLUTION INTRAVENOUS AS NEEDED
Status: DISCONTINUED | OUTPATIENT
Start: 2025-05-20 | End: 2025-05-20

## 2025-05-20 RX ORDER — LIDOCAINE HYDROCHLORIDE 20 MG/ML
INJECTION, SOLUTION EPIDURAL; INFILTRATION; INTRACAUDAL; PERINEURAL AS NEEDED
Status: DISCONTINUED | OUTPATIENT
Start: 2025-05-20 | End: 2025-05-20

## 2025-05-20 RX ORDER — POLYETHYLENE GLYCOL 3350 17 G/17G
17 POWDER, FOR SOLUTION ORAL DAILY
Qty: 7 PACKET | Refills: 0 | Status: SHIPPED | OUTPATIENT
Start: 2025-05-20 | End: 2025-05-27

## 2025-05-20 RX ORDER — PHENYLEPHRINE HCL IN 0.9% NACL 1 MG/10 ML
SYRINGE (ML) INTRAVENOUS AS NEEDED
Status: DISCONTINUED | OUTPATIENT
Start: 2025-05-20 | End: 2025-05-20

## 2025-05-20 RX ORDER — FENTANYL CITRATE 50 UG/ML
INJECTION, SOLUTION INTRAMUSCULAR; INTRAVENOUS AS NEEDED
Status: DISCONTINUED | OUTPATIENT
Start: 2025-05-20 | End: 2025-05-20

## 2025-05-20 RX ORDER — OXYCODONE HYDROCHLORIDE 5 MG/1
5 TABLET ORAL EVERY 6 HOURS PRN
Qty: 4 TABLET | Refills: 0 | Status: SHIPPED | OUTPATIENT
Start: 2025-05-20 | End: 2025-05-21

## 2025-05-20 RX ORDER — OXYCODONE HYDROCHLORIDE 5 MG/1
5 TABLET ORAL EVERY 4 HOURS PRN
Status: DISCONTINUED | OUTPATIENT
Start: 2025-05-20 | End: 2025-05-20 | Stop reason: HOSPADM

## 2025-05-20 RX ORDER — CEFAZOLIN SODIUM 2 G/50ML
2 SOLUTION INTRAVENOUS ONCE
Status: DISCONTINUED | OUTPATIENT
Start: 2025-05-20 | End: 2025-05-20 | Stop reason: HOSPADM

## 2025-05-20 RX ORDER — OXYCODONE HYDROCHLORIDE 5 MG/1
10 TABLET ORAL EVERY 4 HOURS PRN
Status: DISCONTINUED | OUTPATIENT
Start: 2025-05-20 | End: 2025-05-20 | Stop reason: HOSPADM

## 2025-05-20 RX ORDER — SULFAMETHOXAZOLE AND TRIMETHOPRIM 800; 160 MG/1; MG/1
1 TABLET ORAL 2 TIMES DAILY
Qty: 4 TABLET | Refills: 0 | Status: SHIPPED | OUTPATIENT
Start: 2025-05-20 | End: 2025-05-22

## 2025-05-20 RX ORDER — MIDAZOLAM HYDROCHLORIDE 1 MG/ML
INJECTION INTRAMUSCULAR; INTRAVENOUS AS NEEDED
Status: DISCONTINUED | OUTPATIENT
Start: 2025-05-20 | End: 2025-05-20

## 2025-05-20 RX ORDER — ONDANSETRON HYDROCHLORIDE 2 MG/ML
INJECTION, SOLUTION INTRAVENOUS AS NEEDED
Status: DISCONTINUED | OUTPATIENT
Start: 2025-05-20 | End: 2025-05-20

## 2025-05-20 RX ORDER — SODIUM CHLORIDE, SODIUM LACTATE, POTASSIUM CHLORIDE, CALCIUM CHLORIDE 600; 310; 30; 20 MG/100ML; MG/100ML; MG/100ML; MG/100ML
INJECTION, SOLUTION INTRAVENOUS CONTINUOUS PRN
Status: DISCONTINUED | OUTPATIENT
Start: 2025-05-20 | End: 2025-05-20

## 2025-05-20 RX ORDER — PROPOFOL 10 MG/ML
INJECTION, EMULSION INTRAVENOUS AS NEEDED
Status: DISCONTINUED | OUTPATIENT
Start: 2025-05-20 | End: 2025-05-20

## 2025-05-20 RX ORDER — ONDANSETRON HYDROCHLORIDE 2 MG/ML
4 INJECTION, SOLUTION INTRAVENOUS ONCE AS NEEDED
Status: DISCONTINUED | OUTPATIENT
Start: 2025-05-20 | End: 2025-05-20 | Stop reason: HOSPADM

## 2025-05-20 RX ORDER — SODIUM CHLORIDE 0.9 G/100ML
INJECTION, SOLUTION IRRIGATION AS NEEDED
Status: DISCONTINUED | OUTPATIENT
Start: 2025-05-20 | End: 2025-05-20 | Stop reason: HOSPADM

## 2025-05-20 RX ORDER — ACETAMINOPHEN 325 MG/1
650 TABLET ORAL EVERY 4 HOURS PRN
Status: DISCONTINUED | OUTPATIENT
Start: 2025-05-20 | End: 2025-05-20 | Stop reason: HOSPADM

## 2025-05-20 RX ORDER — METOCLOPRAMIDE HYDROCHLORIDE 5 MG/ML
10 INJECTION INTRAMUSCULAR; INTRAVENOUS ONCE AS NEEDED
Status: DISCONTINUED | OUTPATIENT
Start: 2025-05-20 | End: 2025-05-20 | Stop reason: HOSPADM

## 2025-05-20 RX ORDER — LIDOCAINE HYDROCHLORIDE 10 MG/ML
0.1 INJECTION, SOLUTION EPIDURAL; INFILTRATION; INTRACAUDAL; PERINEURAL ONCE
Status: DISCONTINUED | OUTPATIENT
Start: 2025-05-20 | End: 2025-05-20 | Stop reason: HOSPADM

## 2025-05-20 RX ADMIN — SODIUM CHLORIDE, POTASSIUM CHLORIDE, SODIUM LACTATE AND CALCIUM CHLORIDE: 600; 310; 30; 20 INJECTION, SOLUTION INTRAVENOUS at 10:36

## 2025-05-20 RX ADMIN — LIDOCAINE HYDROCHLORIDE 100 MG: 20 INJECTION, SOLUTION EPIDURAL; INFILTRATION; INTRACAUDAL; PERINEURAL at 10:43

## 2025-05-20 RX ADMIN — FENTANYL CITRATE 50 MCG: 50 INJECTION, SOLUTION INTRAMUSCULAR; INTRAVENOUS at 10:43

## 2025-05-20 RX ADMIN — CEFAZOLIN 2 G: 1 INJECTION, POWDER, FOR SOLUTION INTRAMUSCULAR; INTRAVENOUS at 10:47

## 2025-05-20 RX ADMIN — MIDAZOLAM HYDROCHLORIDE 2 MG: 1 INJECTION, SOLUTION INTRAMUSCULAR; INTRAVENOUS at 10:38

## 2025-05-20 RX ADMIN — PROPOFOL 20 MG: 10 INJECTION, EMULSION INTRAVENOUS at 11:35

## 2025-05-20 RX ADMIN — DEXAMETHASONE SODIUM PHOSPHATE 8 MG: 4 INJECTION, SOLUTION INTRAMUSCULAR; INTRAVENOUS at 10:50

## 2025-05-20 RX ADMIN — PROPOFOL 180 MG: 10 INJECTION, EMULSION INTRAVENOUS at 10:43

## 2025-05-20 RX ADMIN — ONDANSETRON 4 MG: 2 INJECTION, SOLUTION INTRAMUSCULAR; INTRAVENOUS at 10:50

## 2025-05-20 RX ADMIN — Medication 200 MCG: at 10:50

## 2025-05-20 RX ADMIN — ACETAMINOPHEN 650 MG: 325 TABLET ORAL at 12:54

## 2025-05-20 RX ADMIN — OXYCODONE HYDROCHLORIDE 5 MG: 5 TABLET ORAL at 12:54

## 2025-05-20 SDOH — HEALTH STABILITY: MENTAL HEALTH: CURRENT SMOKER: 0

## 2025-05-20 ASSESSMENT — PAIN SCALES - GENERAL
PAINLEVEL_OUTOF10: 5 - MODERATE PAIN
PAINLEVEL_OUTOF10: 0 - NO PAIN
PAINLEVEL_OUTOF10: 3
PAINLEVEL_OUTOF10: 5 - MODERATE PAIN
PAINLEVEL_OUTOF10: 5 - MODERATE PAIN
PAINLEVEL_OUTOF10: 1
PAINLEVEL_OUTOF10: 3
PAINLEVEL_OUTOF10: 3

## 2025-05-20 ASSESSMENT — PAIN DESCRIPTION - DESCRIPTORS
DESCRIPTORS: BURNING

## 2025-05-20 ASSESSMENT — PAIN - FUNCTIONAL ASSESSMENT
PAIN_FUNCTIONAL_ASSESSMENT: 0-10
PAIN_FUNCTIONAL_ASSESSMENT: 0-10
PAIN_FUNCTIONAL_ASSESSMENT: UNABLE TO SELF-REPORT
PAIN_FUNCTIONAL_ASSESSMENT: UNABLE TO SELF-REPORT
PAIN_FUNCTIONAL_ASSESSMENT: 0-10

## 2025-05-20 ASSESSMENT — COLUMBIA-SUICIDE SEVERITY RATING SCALE - C-SSRS
6. HAVE YOU EVER DONE ANYTHING, STARTED TO DO ANYTHING, OR PREPARED TO DO ANYTHING TO END YOUR LIFE?: NO
6. HAVE YOU EVER DONE ANYTHING, STARTED TO DO ANYTHING, OR PREPARED TO DO ANYTHING TO END YOUR LIFE?: NO
1. IN THE PAST MONTH, HAVE YOU WISHED YOU WERE DEAD OR WISHED YOU COULD GO TO SLEEP AND NOT WAKE UP?: NO
2. HAVE YOU ACTUALLY HAD ANY THOUGHTS OF KILLING YOURSELF?: NO
1. IN THE PAST MONTH, HAVE YOU WISHED YOU WERE DEAD OR WISHED YOU COULD GO TO SLEEP AND NOT WAKE UP?: NO
2. HAVE YOU ACTUALLY HAD ANY THOUGHTS OF KILLING YOURSELF?: NO

## 2025-05-20 NOTE — ANESTHESIA PROCEDURE NOTES
Airway  Date/Time: 5/20/2025 10:46 AM  Reason: elective    Airway not difficult    Staffing  Performed: CRNA   Authorized by: Stacy Chabmers DO    Performed by: KRYSTLE Matthews-IMTIAZ  Patient location during procedure: OR    Patient Condition  Indications for airway management: anesthesia and airway protection  Patient position: sniffing  MILS maintained throughout  Sedation level: deep     Final Airway Details   Preoxygenated: yes  Final airway type: supraglottic airway  Successful airway: Supraglottic airway: I-Gel LMA #4.  Size: 4   Ventilation between attempts: BVM  Number of attempts at approach: 1    Additional Comments  Smooth IV induction, atraumatic I-Gel LMA #4 placed, teeth/lips intact.

## 2025-05-20 NOTE — DISCHARGE INSTRUCTIONS
DEPARTMENT OF UROLOGY  DISCHARGE INSTRUCTIONS -- Holmium laser enucleation of the prostate  Outpatient Surgery    C O N F I D E N T I A L   I N F O R M A T I O N    Jorge Farr        Call [phone] during regular daytime business hours (8:00 am - 5:00 pm) and after 5:00 pm and ask for the Urology resident with any questions or concerns.      If it is a life-threatening situation, proceed to the nearest emergency department.        Thank you for the opportunity to care for you today.  Your health and healing are very important to us.  We hope we made you feel as comfortable as possible and are committed to your recovery and continued well-being.      The following is a brief overview of your holmium laser enucleation of the prostate today. Some of the information contained on this summary may be confidential.  This information should be kept in your records and should be shared with your regular doctor.    Physicians:   Dr. Hines    Procedure performed: Prostate Resection  Pending results:   pathology of tissue taken from your prostate    What to Expect During your Recovery and Home Care  Anesthesia Side Effects   You received general anesthesia today.  You may feel sleepy, tired, or have a sore throat.   You may also feel drowsiness, dizziness, or inability to think clearly.  For your safety, do not drive, drink alcoholic beverages, take any unprescribed medication or make any important decisions for 24 hours.  A responsible adult should be with you for 24 hours.        Activity and Recovery    No heavy lifting over ten pounds. Limit activity while urinary catheter is in place. Avoid activities that would cause pulling or tugging on your catheter.    Do not drive or operate heavy machinery while taking narcotic pain medications as these medications can alter perception, impair judgement, and slow reaction times.    Pain Control  Unfortunately, you may experience pain after your procedure.  Adequate management  can include alternative measures to help ease your pain and can include over the counter Tylenol or Oxycodone can be taken as prescribed as needed for breakthrough pain. Do not take more than 4,000mg of Tylenol in a 24-hour period.      You may also experience bladder spasms due to the catheter. Ditropan may be prescribed to help alleviate this problem.    Nausea/Vomiting   Clear liquids are best tolerated at first. Start slow, advance your diet as tolerated to normal foods. Avoid spicy, greasy, heavy foods at first. Also, you may feel nauseous or like you need to vomit if you take any type of medication on an empty stomach.  Call your physician if you are unable to eat or drink and have persistent vomiting.    Signs of Bleeding   You are going to have some blood in your urine. Your urine will be light pink to yellow. You always want to look at the urine in the tubing of your catheter and not in the large urine collection bag to check for bleeding. If urine becomes thick dark tj red, has large clots or stops draining, please notify your physician.    Treatment/wound care:   Keep area(s) clean and dry. Clean around the tip or your penis were the catheter goes in daily with mild soap and water.  It is okay to shower 24 hours after time of surgery.    Do not submerge your catheter in standing water until seen for follow up appointment (no tub bathing, swimming, or hot tubs).      Signs of Infection  Signs of infection can include fever, drainage(green/yellow), chills, burning sensation with passing of urine, catheter leakage, or severe abdominal pain.  If you see any of these occur, please contact your doctor's office at [phone].  Any fever higher than 100.4, especially if associated with an ill feeling, abdominal pain, chills, or nausea should be reported to your surgeon.      Assist in bowel movements/urination  Increase fiber in diet  Increase water (6 to 8 glasses)  Increase walking     Additional  Instructions: CATHETER CARE  Always keep the catheters tubing and drainage bag below the level of your bladder.  Avoid loops and kinks in the catheter tubing.  NOTIFY your physician if catheter falls out or catheter seems clogged and urine is not draining.   Do not wear the small leg bag to bed you should be provided with a larger overnight bag that you should wear to bed and can hang over the side of the bed.  We recommend wearing the large bag in the shower, as this is easy to dry, and you do not get your leg straps wet from your leg bag.   Your catheter should be secured to your upper thigh, do not allow it to hang or dangle.  Your catheter will be removed at your post-operative appointment.

## 2025-05-20 NOTE — ANESTHESIA PREPROCEDURE EVALUATION
Patient: Jorge Farr    Procedure Information       Date/Time: 05/20/25 1000    Procedure: Anatomical Endoscopic Prostate Enucleation ~ HoLEP (Prostate)    Location: [facility] OR 03 / Virtual [facility] OR    Surgeons: Kavon Hines MD            Relevant Problems   Anesthesia (within normal limits)      /Renal   (+) Benign prostatic hyperplasia with urinary retention   (+) Enlarged prostate with urinary retention      Hematology   (+) Thrombocytopenia      HEENT   (+) Sensorineural hearing loss, bilateral       Clinical information reviewed:   Tobacco  Allergies  Meds   Med Hx  Surg Hx   Fam Hx  Soc Hx        NPO Detail:  NPO/Void Status  Carbohydrate Drink Given Prior to Surgery? : N  Date of Last Liquid: 05/19/25  Time of Last Liquid: 2300  Date of Last Solid: 05/19/25  Time of Last Solid: 2200  Last Intake Type: Clear fluids  Time of Last Void: 0900         Physical Exam    Airway  Mallampati: I  TM distance: >3 FB  Neck ROM: full  Mouth opening: 3 or more finger widths     Cardiovascular - normal examRate: normal     Dental - normal exam     Pulmonary - normal examBreath sounds clear to auscultation     Abdominal - normal exam           Anesthesia Plan    History of general anesthesia?: yes  History of complications of general anesthesia?: no    ASA 2     general     The patient is not a current smoker.    intravenous induction   Anesthetic plan and risks discussed with patient.  Use of blood products discussed with patient who.    Plan discussed with CRNA.

## 2025-05-20 NOTE — ANESTHESIA POSTPROCEDURE EVALUATION
Patient: Jorge Farr    Procedure Summary       Date: 05/20/25 Room / Location: [facility] OR 03 / [facility] OR    Anesthesia Start: 1036 Anesthesia Stop: 1221    Procedure: Anatomical Endoscopic Prostate Enucleation ~ HoLEP (Prostate) Diagnosis:       Enlarged prostate with urinary retention      (Enlarged prostate with urinary retention [N40.1, R33.8])    Surgeons: Kavon Hines MD Responsible Provider: Stacy Chambers DO    Anesthesia Type: general ASA Status: 2            Anesthesia Type: general    Vitals Value Taken Time   /88 05/20/25 12:30   Temp 36 °C (96.8 °F) 05/20/25 12:30   Pulse 57 05/20/25 12:30   Resp 13 05/20/25 12:30   SpO2 98 % 05/20/25 12:30       Anesthesia Post Evaluation    Patient location during evaluation: PACU  Patient participation: complete - patient participated  Level of consciousness: awake  Pain management: adequate  Airway patency: patent  Cardiovascular status: acceptable  Respiratory status: acceptable  Hydration status: acceptable  Postoperative Nausea and Vomiting: none        No notable events documented.

## 2025-05-20 NOTE — OP NOTE
Anatomical Endoscopic Prostate Enucleation ~ HoLEP Operative Note     Date: 2025  OR Location: [facility] A OR    Name: Jorge Farr, : 1955, Age: 69 y.o., MRN: [id number], Sex: male    Diagnosis  Pre-op Diagnosis      * Enlarged prostate with urinary retention [N40.1, R33.8] Post-op Diagnosis     * Enlarged prostate with urinary retention [N40.1, R33.8]     Procedures  Anatomical Endoscopic Prostate Enucleation ~ HoLEP  76925 - MA LASER ENUCLEATION PROSTATE W/MORCELLATION      Surgeons      * Kavon Hines - Primary    Resident/Fellow/Other Assistant:  Surgeons and Role:     * Kallie Vick MD - Resident - Assisting    Staff:   Scrub Person: Colleen  Circulator: Yessenia Sanchez Circulator: Ana    Anesthesia Staff: Anesthesiologist: Stacy Chambers DO  CRNA: Cristela Gar APRN-CRNA; Elle Pritchard APRN-CRNA    Procedure Summary  Anesthesia: General  ASA: II  Estimated Blood Loss: 100mL  Intra-op Medications:   Administrations occurring from 1000 to 1155 on 25:   Medication Name Total Dose   sodium chloride 0.9 % irrigation solution 3,000 mL   ceFAZolin (Ancef) vial 1 g 2 g   dexAMETHasone (Decadron) 4 mg/mL IV Syringe 2 mL 8 mg   fentaNYL (Sublimaze) injection 50 mcg/mL 50 mcg   LR infusion Cannot be calculated   lidocaine PF (Xylocaine-MPF) local injection 2 % 100 mg   midazolam PF (Versed) injection 1 mg/mL 2 mg   ondansetron (Zofran) 2 mg/mL injection 4 mg   phenylephrine 100 mcg/mL syringe 10 mL (prefilled) 200 mcg   propofol (Diprivan) injection 10 mg/mL 200 mg              Anesthesia Record               Intraprocedure I/O Totals          Intake    LR infusion 800.00 mL    Total Intake 800 mL       Output    Est. Blood Loss 100 mL    Total Output 100 mL       Net    Net Volume 700 mL          Specimen:   ID Type Source Tests Collected by Time   1 : prostate tissue Tissue PROSTATE HOLEP SURGICAL PATHOLOGY EXAM Kavon Hines MD 2025 1105   2 : BLADDER BIOPSY Tissue  BLADDER BIOPSY SURGICAL PATHOLOGY EXAM Kavon Hines MD 5/20/2025 1204                 Drains and/or Catheters:   Urethral Catheter Latex 22 Fr. (Active)   Site Assessment Clean;Skin intact 05/20/25 1218   Collection Container Standard drainage bag 05/20/25 1218   Securement Method Securing device (Describe) 05/20/25 1218   Irrigant Normal saline 05/20/25 1218       Tourniquet Times:         Implants:     Findings:   Massive bladder distension  Trilobar obstruction, hypervascular  At the conclusion of the case, a small bladder tumor was seen near the bladder neck lateral to the L UO. This was bx and fulgrated with the laser.    Indications: Jorge Farr is an 69 y.o. male who is having surgery for Enlarged prostate with urinary retention [N40.1, R33.8].     The patient was seen in the preoperative area. The risks, benefits, complications, treatment options, non-operative alternatives, expected recovery and outcomes were discussed with the patient. The possibilities of reaction to medication, pulmonary aspiration, injury to surrounding structures, bleeding, recurrent infection, the need for additional procedures, failure to diagnose a condition, and creating a complication requiring transfusion or operation were discussed with the patient. The patient concurred with the proposed plan, giving informed consent.  The site of surgery was properly noted/marked if necessary per policy. The patient has been actively warmed in preoperative area. Preoperative antibiotics have been ordered and given within 1 hours of incision. Venous thrombosis prophylaxis have been ordered including bilateral sequential compression devices    Procedure Details:   1. HOLMIUM LASER ENUCLEATION OF THE PROSTATE      Laser Settings Used:  Cutting 2 J, 40 Hz.  Coagulation 1.5 J, 30 Hz.   Tk or Virtual basket used? Tk  Preoperative Prostate Size:  approx 230 cubic centimeters.     Prostate configuration: trilobar  Complication: none  EBL:  100 ml  Catheter: 22Fr 3 way  Antibiotics: ancef  Specimen: Morcellated prostatic tissue.    DESCRIPTION OF PROCEDURE:      The patient was brought to the OR and after good general anesthesia was achieved, the patient was prepped and draped in dorsal lithotomy position. All pressure points were padded.  Surgical pause was performed.  The patient was identified using 2 identifiers.  The correct surgical procedure was confirmed.  All members of surgical and anesthesia team were in agreement.  The patient received prophylactic antibiotic and the procedure started.    Cystoscopy: The patient's bladder was first entered with a 22-[place] rigid cystoscope with 30-degree lens.  Cystoscopic examination showed normal anterior urethra.  The posterior urethra showed trilobar obstruction with hypervascularity.  Both ureteral orifices were identified away from the bladder neck. Bladder was massively dilated. The cystoscope was removed and the meatus was dilated up to 28-[place] using sounds.  The urethra was then filled with lidocaine gel and a 26-[place] Liang continuous-flow resectoscope was placed.  The holmium laser apparatus was placed through the sheath.  Using a 550-micron end-firing quartz laser fiber, a laser bridge, and a 7-[place] laser stabilizing catheter, the procedure was started with the above-mentioned laser setting.    A en bloc technique was performed with an initial incision at the apex and circumferentially using low energy.  We continued to develop our anterior plane at the apex, identifying the capsule and freeing up the anterior apex well within the sphincter. We then proceeded with our posterior dissection, developing the posterior space toward the bladder neck and continuing our incision laterally to 9 and 3:00.    Then we turned our attention to the lateral attachments of the prostate.  Using laser energy, taking care to avoid any damage to the sphincter, we connected our previously dissected anterior and  posterior planes to completely liberate the apex of the prostate preserving the sphincter. We then continued our dissection circumferentially, freeing the prostate systematically from apex to its attachments at the bladder neck. The adenoma was then pushed into the bladder.     Once the prostate had been fully enucleated, the laser was defocused on any sources of bleeding to get additional hemostasis.  There was good hemostasis at the conclusion of this procedure.  A few small remaining nodular adenomas were then resected from the capsule.      The laser bridge apparatus and the resectoscope were then removed and the offset Liang nephroscope and Liang - Pricornellha tissue morcellator were then introduced and used to completely morcellate the tissue.  Two inflows were used during this portion of the procedure to fully distend the bladder and to prevent any inadvertent bladder injury.  The bladder was then ellik'd out after insertion of the inner sheath and reinspected with the cystoscope showing no residual adenomas.  Hemostasis was ensured at the conclusion of the procedure and both ureteral orifices were confirmed and identified well away from the area of resection.  No residual adenoma could be identified.     Just before we finished, we identified a small subcentimeter bladder tumor lateral to the left UO near the bladder neck.  We used flexible graspers to biopsy this lesion and then used the laser to fulgurate the area of biopsy.    Following that, a three-way Espinoza catheter on a guide was placed into the bladder and the balloon was filled.  The bladder was irrigated near clear and the continuous irrigation was started.     The patient tolerated the procedure well and was shifted to recovery in good general condition    Evidence of Infection: No   Complications:  None; patient tolerated the procedure well.    Disposition: PACU - hemodynamically stable.  Condition: stable                 Additional Details:      Attending Attestation: I performed the procedure.    Kavon Hines  Phone Number: [phone]

## 2025-05-21 ASSESSMENT — PAIN SCALES - GENERAL: PAINLEVEL_OUTOF10: 0 - NO PAIN

## 2025-05-21 NOTE — PROGRESS NOTES
HPI    69 y.o. male being seen with the following problem list:    Problem list:  BPH; s/p HoLEP 5/5/25 2/12/25 US abd  -Marked prostatic enlargement with an estimated volume of 231g.  -PVR 2106 mL with a palpable abnormality representing a markedly distended urinary bladder extending to the level of the umbilicus.  -No hydronephrosis of either kidney.  -Wide mouth diverticulum arising from left posterolateral aspect of the urinary bladder.     Cr 12/2024 - 1.06     04/17/25 - PVR 1400cc. Mostly asymptomatic, usually sleeps through the night. Occasionally with some abdominal discomfort resolved with voiding. Stream is fair. No leakage. No UTIs. No hematuria. No recent blood work.     PSA  12/2024 - 1.72  12/2023- 1.76  11/2022 - 1.64  11/2021 - 1.62    05/21/25 - Instilled 250 mL; no urge to void. Did well postop      Lab Results   Component Value Date    PSA 1.55 10/26/2020    PSA 1.92 10/22/2019    PSA 1.28 10/12/2018         Current Medications:  Current Medications[1]     Active Problems:  Jorge Farr is a 69 y.o. male with the following Problems and Medications.  Problem List[2]  Current Medications[3]    PMH:  Medical History[4]    PSH:  Surgical History[5]    FMH:  Family History[6]    SHx:  Social History[7]    Allergies:  RX Allergies[8]      Assessment/Plan  Instilled 250 mL; no urge to void. Did well symptomatically despite significant retention, low concern that he will be able to void once he is more full. Follow-up in 1 week for PVR.         Scribe Attestation  By signing my name below, I, Oliver Marin   attest that this documentation has been prepared under the direction and in the presence of Kavon Hines MD.         [1]   Current Outpatient Medications   Medication Sig Dispense Refill    oxyCODONE (Roxicodone) 5 mg immediate release tablet Take 1 tablet (5 mg) by mouth every 6 hours if needed (breakthrough pain) for up to 1 day. 4 tablet 0    polyethylene glycol (Glycolax,  Miralax) 17 gram packet Take 17 g by mouth once daily for 7 days. 7 packet 0    sulfamethoxazole-trimethoprim (Bactrim DS) 800-160 mg tablet Take 1 tablet by mouth 2 times a day for 2 days. 4 tablet 0     No current facility-administered medications for this visit.   [2]   Patient Active Problem List  Diagnosis    Cochlear implant in place    Disequilibrium    Sensorineural hearing loss, bilateral    Thrombocytopenia    Medicare annual wellness visit, subsequent    Periumbilical mass    Diverticulum of bladder    Bladder outlet obstruction    Benign prostatic hyperplasia with urinary retention    Enlarged prostate with urinary retention   [3]   Current Outpatient Medications   Medication Sig Dispense Refill    oxyCODONE (Roxicodone) 5 mg immediate release tablet Take 1 tablet (5 mg) by mouth every 6 hours if needed (breakthrough pain) for up to 1 day. 4 tablet 0    polyethylene glycol (Glycolax, Miralax) 17 gram packet Take 17 g by mouth once daily for 7 days. 7 packet 0    sulfamethoxazole-trimethoprim (Bactrim DS) 800-160 mg tablet Take 1 tablet by mouth 2 times a day for 2 days. 4 tablet 0     No current facility-administered medications for this visit.   [4]   Past Medical History:  Diagnosis Date    Abdominal distension (gaseous) 07/22/2016    Abdominal bloating    Flatulence 07/22/2016    Excessive flatus    HL (hearing loss) At birth    Migraine with aura, not intractable, without status migrainosus 04/19/2017    Ophthalmic migraine    Other specified symptoms and signs involving the digestive system and abdomen 07/22/2016    Change in bowel function    Personal history of other diseases of male genital organs     History of hydrocele    Personal history of other diseases of the musculoskeletal system and connective tissue     History of arthritis    Personal history of other diseases of the nervous system and sense organs 02/14/2017    History of migraine with aura    Transient cerebral ischemic attack,  unspecified 04/19/2017    TIA (transient ischemic attack)    Unspecified visual disturbance 04/19/2017    Vision changes   [5]   Past Surgical History:  Procedure Laterality Date    CT ANGIO NECK  12/14/2016    CT NECK ANGIO W AND WO IV CONTRAST 12/14/2016 [facility]    CT HEAD ANGIO W AND WO IV CONTRAST  12/14/2016    CT HEAD ANGIO W AND WO IV CONTRAST 12/14/2016 [facility]    FRACTURE SURGERY  Broken left collarbone    JOINT REPLACEMENT  Right knee replacement    OTHER SURGICAL HISTORY  08/26/2014    Closed Treatment Of Clavicular Fracture    OTHER SURGICAL HISTORY  08/26/2014    Inner Ear Surgery Cochlear Device Implantation    OTHER SURGICAL HISTORY  11/12/2018    Cochlear implant surgery    OTHER SURGICAL HISTORY  11/12/2018    Knee arthroscopy   [6]   Family History  Problem Relation Name Age of Onset    Diabetes Mother Melissa Farr    [7]   Social History  Tobacco Use    Smoking status: Never     Passive exposure: Never    Smokeless tobacco: Never   Substance Use Topics    Alcohol use: Yes     Alcohol/week: 2.0 standard drinks of alcohol     Types: 1 Glasses of wine, 1 Cans of beer per week    Drug use: Never   [8] No Known Allergies

## 2025-05-22 ENCOUNTER — APPOINTMENT (OUTPATIENT)
Dept: UROLOGY | Facility: HOSPITAL | Age: 70
End: 2025-05-22
Payer: MEDICARE

## 2025-05-22 DIAGNOSIS — N40.1 BENIGN PROSTATIC HYPERPLASIA WITH URINARY RETENTION: Primary | ICD-10-CM

## 2025-05-22 DIAGNOSIS — R33.8 BENIGN PROSTATIC HYPERPLASIA WITH URINARY RETENTION: Primary | ICD-10-CM

## 2025-05-22 DIAGNOSIS — N32.3 DIVERTICULUM OF BLADDER: ICD-10-CM

## 2025-05-22 PROCEDURE — 99211 OFF/OP EST MAY X REQ PHY/QHP: CPT | Performed by: UROLOGY

## 2025-05-28 NOTE — PROGRESS NOTES
HPI    69 y.o. male being seen with the following problem list:    Problem list:  BPH; s/p HoLEP 5/20/25 Path pending      2/12/25 US abd  -Marked prostatic enlargement with an estimated volume of 231g.  -PVR 2106 mL with a palpable abnormality representing a markedly distended urinary bladder extending to the level of the umbilicus.  -No hydronephrosis of either kidney.  -Wide mouth diverticulum arising from left posterolateral aspect of the urinary bladder.     Cr 12/2024 - 1.06     04/17/25 - PVR 1400cc. Mostly asymptomatic, usually sleeps through the night. Occasionally with some abdominal discomfort resolved with voiding. Stream is fair. No leakage. No UTIs. No hematuria. No recent blood work.     PSA  12/2024 - 1.72  12/2023- 1.76  11/2022 - 1.64  11/2021 - 1.62     05/21/25 - Instilled 250 mL; no urge to void. Did well postop    05/29/25 - PVR 30cc, pre op was 1391cc. Stream is getting better, symptoms are improving.         Lab Results   Component Value Date    PSA 1.55 10/26/2020    PSA 1.92 10/22/2019    PSA 1.28 10/12/2018              Current Medications:  Current Medications[1]     Active Problems:  Jorge Farr is a 69 y.o. male with the following Problems and Medications.  Problem List[2]  Current Medications[3]    PMH:  Medical History[4]    PSH:  Surgical History[5]    FMH:  Family History[6]    SHx:  Social History[7]    Allergies:  RX Allergies[8]      Assessment/Plan  About 1 week s/p HoLEP. Emptying significantly improved, stream is improving. Symptoms are getting better. Path pending.     FU in 3 months with PVR.     Scribe Attestation  By signing my name below, I, Oliver Soto, attest that this documentation has been prepared under the direction and in the presence of Kavon Hines MD.         [1]   No current outpatient medications on file.     No current facility-administered medications for this visit.   [2]   Patient Active Problem List  Diagnosis    Cochlear implant in place     Disequilibrium    Sensorineural hearing loss, bilateral    Thrombocytopenia    Medicare annual wellness visit, subsequent    Periumbilical mass    Diverticulum of bladder    Bladder outlet obstruction    Benign prostatic hyperplasia with urinary retention    Enlarged prostate with urinary retention   [3]   No current outpatient medications on file.     No current facility-administered medications for this visit.   [4]   Past Medical History:  Diagnosis Date    Abdominal distension (gaseous) 07/22/2016    Abdominal bloating    Flatulence 07/22/2016    Excessive flatus    HL (hearing loss) At birth    Migraine with aura, not intractable, without status migrainosus 04/19/2017    Ophthalmic migraine    Other specified symptoms and signs involving the digestive system and abdomen 07/22/2016    Change in bowel function    Personal history of other diseases of male genital organs     History of hydrocele    Personal history of other diseases of the musculoskeletal system and connective tissue     History of arthritis    Personal history of other diseases of the nervous system and sense organs 02/14/2017    History of migraine with aura    Transient cerebral ischemic attack, unspecified 04/19/2017    TIA (transient ischemic attack)    Unspecified visual disturbance 04/19/2017    Vision changes   [5]   Past Surgical History:  Procedure Laterality Date    CT ANGIO NECK  12/14/2016    CT NECK ANGIO W AND WO IV CONTRAST 12/14/2016 [facility]    CT HEAD ANGIO W AND WO IV CONTRAST  12/14/2016    CT HEAD ANGIO W AND WO IV CONTRAST 12/14/2016 [facility]    FRACTURE SURGERY  Broken left collarbone    JOINT REPLACEMENT  Right knee replacement    OTHER SURGICAL HISTORY  08/26/2014    Closed Treatment Of Clavicular Fracture    OTHER SURGICAL HISTORY  08/26/2014    Inner Ear Surgery Cochlear Device Implantation    OTHER SURGICAL HISTORY  11/12/2018    Cochlear implant surgery    OTHER SURGICAL HISTORY  11/12/2018     Knee arthroscopy   [6]   Family History  Problem Relation Name Age of Onset    Diabetes Mother Melissa Yordan    [7]   Social History  Tobacco Use    Smoking status: Never     Passive exposure: Never    Smokeless tobacco: Never   Substance Use Topics    Alcohol use: Yes     Alcohol/week: 2.0 standard drinks of alcohol     Types: 1 Glasses of wine, 1 Cans of beer per week    Drug use: Never   [8] No Known Allergies

## 2025-05-29 ENCOUNTER — OFFICE VISIT (OUTPATIENT)
Dept: UROLOGY | Facility: HOSPITAL | Age: 70
End: 2025-05-29
Payer: MEDICARE

## 2025-05-29 DIAGNOSIS — N40.1 BENIGN PROSTATIC HYPERPLASIA WITH URINARY RETENTION: ICD-10-CM

## 2025-05-29 DIAGNOSIS — R33.8 BENIGN PROSTATIC HYPERPLASIA WITH URINARY RETENTION: ICD-10-CM

## 2025-05-29 PROCEDURE — G2211 COMPLEX E/M VISIT ADD ON: HCPCS | Performed by: UROLOGY

## 2025-05-29 PROCEDURE — 99211 OFF/OP EST MAY X REQ PHY/QHP: CPT | Performed by: UROLOGY

## 2025-05-29 PROCEDURE — 1159F MED LIST DOCD IN RCRD: CPT | Performed by: UROLOGY

## 2025-06-04 ENCOUNTER — APPOINTMENT (OUTPATIENT)
Dept: UROLOGY | Facility: HOSPITAL | Age: 70
End: 2025-06-04
Payer: MEDICARE

## 2025-06-05 LAB
LABORATORY COMMENT REPORT: NORMAL
PATH REPORT.FINAL DX SPEC: NORMAL
PATH REPORT.GROSS SPEC: NORMAL
PATH REPORT.RELEVANT HX SPEC: NORMAL
PATH REPORT.TOTAL CANCER: NORMAL

## 2025-06-06 ENCOUNTER — TELEMEDICINE (OUTPATIENT)
Dept: UROLOGY | Facility: HOSPITAL | Age: 70
End: 2025-06-06
Payer: MEDICARE

## 2025-06-06 ENCOUNTER — APPOINTMENT (OUTPATIENT)
Dept: UROLOGY | Facility: HOSPITAL | Age: 70
End: 2025-06-06
Payer: MEDICARE

## 2025-06-06 DIAGNOSIS — C67.0 MALIGNANT NEOPLASM OF TRIGONE OF URINARY BLADDER (MULTI): Primary | ICD-10-CM

## 2025-06-06 PROCEDURE — G2211 COMPLEX E/M VISIT ADD ON: HCPCS | Performed by: UROLOGY

## 2025-06-06 PROCEDURE — 99215 OFFICE O/P EST HI 40 MIN: CPT | Performed by: UROLOGY

## 2025-06-06 NOTE — PROGRESS NOTES
HPI    69 y.o. male being seen with the following problem list:    Problem list:  BPH; s/p HoLEP + bladder bx 5/20/25 Path 52g benign prostate tissue. 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  -PVR 2106 mL with a palpable abnormality representing a markedly distended urinary bladder extending to the level of the umbilicus.  -No hydronephrosis of either kidney.  -Wide mouth diverticulum arising from left posterolateral aspect of the urinary bladder.     Cr 12/2024 - 1.06     04/17/25 - PVR 1400cc. Mostly asymptomatic, usually sleeps through the night. Occasionally with some abdominal discomfort resolved with voiding. Stream is fair. No leakage. No UTIs. No hematuria. No recent blood work.     PSA  12/2024 - 1.72  12/2023- 1.76  11/2022 - 1.64  11/2021 - 1.62     05/21/25 - Instilled 250 mL; no urge to void. Did well postop     05/29/25 - PVR 30cc, pre op was 1391cc. Stream is getting better, symptoms are improving.     06/06/25 - seen back over [facility] to discuss pathology. Doing well. Urine is clearing.     Lab Results   Component Value Date    PSA 1.55 10/26/2020    PSA 1.92 10/22/2019    PSA 1.28 10/12/2018              Current Medications:  Current Medications[1]     Active Problems:  Jorge Farr is a 69 y.o. male with the following Problems and Medications.  Problem List[2]  Current Medications[3]    PMH:  Medical History[4]    PSH:  Surgical History[5]    FMH:  Family History[6]    SHx:  Social History[7]    Allergies:  RX Allergies[8]      Assessment/Plan  Reviewed his benign prostate pathology.    Reviewed his bladder path showing carcinoma in situ (CIS). Discussed the significance of this diagnosis. Discussed the framework of bladder cancer, reviewing staging and grading. Next step will be repeat cystoscopy with bladder biopsy in OR, this time with blue light cystoscopy to improve diagnostic yield. As long as no  significant changes at that time, will then proceed with induction BCG about 6 weeks later for a weekly instillation x6 weeks. Following that, would proceed with bladder cancer surveillance as per AUA guidelines.    Fu 1 mo in the OR for blue light cysto, bladder bx and fulguration.    Kavon Hines MD             [1]   No current outpatient medications on file.     No current facility-administered medications for this visit.   [2]   Patient Active Problem List  Diagnosis    Cochlear implant in place    Disequilibrium    Sensorineural hearing loss, bilateral    Thrombocytopenia    Medicare annual wellness visit, subsequent    Periumbilical mass    Diverticulum of bladder    Bladder outlet obstruction    Benign prostatic hyperplasia with urinary retention    Enlarged prostate with urinary retention   [3]   No current outpatient medications on file.     No current facility-administered medications for this visit.   [4]   Past Medical History:  Diagnosis Date    Abdominal distension (gaseous) 07/22/2016    Abdominal bloating    Flatulence 07/22/2016    Excessive flatus    HL (hearing loss) At birth    Migraine with aura, not intractable, without status migrainosus 04/19/2017    Ophthalmic migraine    Other specified symptoms and signs involving the digestive system and abdomen 07/22/2016    Change in bowel function    Personal history of other diseases of male genital organs     History of hydrocele    Personal history of other diseases of the musculoskeletal system and connective tissue     History of arthritis    Personal history of other diseases of the nervous system and sense organs 02/14/2017    History of migraine with aura    Transient cerebral ischemic attack, unspecified 04/19/2017    TIA (transient ischemic attack)    Unspecified visual disturbance 04/19/2017    Vision changes   [5]   Past Surgical History:  Procedure Laterality Date    CT ANGIO NECK  12/14/2016    CT NECK ANGIO W AND WO IV [place]AST  [place] [facility]    CT HEAD ANGIO W AND WO IV CONTRAST  12/14/2016    CT HEAD ANGIO W AND WO IV CONTRAST [place] [facility]    FRACTURE SURGERY  Broken left collarbone    JOINT REPLACEMENT  Right knee replacement    OTHER SURGICAL HISTORY  08/26/2014    Closed Treatment Of Clavicular Fracture    OTHER SURGICAL HISTORY  08/26/2014    Inner Ear Surgery Cochlear Device Implantation    OTHER SURGICAL HISTORY  11/12/2018    Cochlear implant surgery    OTHER SURGICAL HISTORY  11/12/2018    Knee arthroscopy   [6]   Family History  Problem Relation Name Age of Onset    Diabetes Mother Melissa Farr    [7]   Social History  Tobacco Use    Smoking status: Never     Passive exposure: Never    Smokeless tobacco: Never   Substance Use Topics    Alcohol use: Yes     Alcohol/week: 2.0 standard drinks of alcohol     Types: 1 Glasses of wine, 1 Cans of beer per week    Drug use: Never   [8] No Known Allergies

## 2025-07-03 ENCOUNTER — PREP FOR PROCEDURE (OUTPATIENT)
Dept: UROLOGY | Facility: HOSPITAL | Age: 70
End: 2025-07-03
Payer: MEDICARE

## 2025-07-03 DIAGNOSIS — D09.0 BLADDER CA IN SITU: Primary | ICD-10-CM

## 2025-07-03 RX ORDER — CEFAZOLIN SODIUM 2 G/100ML
2 INJECTION, SOLUTION INTRAVENOUS ONCE
OUTPATIENT
Start: 2025-07-03 | End: 2025-07-03

## 2025-07-22 ENCOUNTER — CLINICAL SUPPORT (OUTPATIENT)
Dept: PREADMISSION TESTING | Facility: HOSPITAL | Age: 70
End: 2025-07-22
Payer: MEDICARE

## 2025-07-22 NOTE — CPM/PAT NURSE NOTE
CPM/PAT Nurse Note      Name: Jorge Farr (Jorge Farr)  /Age: 1955/69 y.o.       Medical History[1]    Surgical History[2]    Patient  reports that he is not currently sexually active and has had partner(s) who are female. He reports using the following method of birth control/protection: None.    Family History[3]    Allergies[4]    Prior to Admission medications   Not on File        PAT ROS     DASI Risk Score      Flowsheet Row Questionnaire Series Submission from 2025 in [facility] OR with Generic Provider Angy   Can you take care of yourself (eat, dress, bathe, or use toilet)?  2.75  filed at 2025 1026   Can you walk indoors, such as around your house? 1.75  filed at 2025 1026   Can you walk a block or two on level ground?  2.75  filed at 2025 1026   Can you climb a flight of stairs or walk up a hill? 5.5  filed at 2025 1026   Can you run a short distance? 8  filed at 2025 1026   Can you do light work around the house like dusting or washing dishes? 2.7  filed at 2025 1026   Can you do moderate work around the house like vacuuming, sweeping floors or carrying groceries? 3.5  filed at 2025 1026   Can you do heavy work around the house like scrubbing floors or lifting and moving heavy furniture?  8  filed at 2025 1026   Can you do yard work like raking leaves, weeding or pushing a mower? 4.5  filed at 2025 1026   Can you have sexual relations? 0  filed at 2025 1026   Can you participate in moderate recreational activities like golf, bowling, dancing, doubles tennis or throwing a baseball or football? 6  filed at 2025 1026   Can you participate in strenous sports like swimming, singles tennis, football, basketball, or skiing? 0  filed at 2025 1026   DASI SCORE 45.45  filed at 2025 1026   METS Score (Will be calculated only when all the questions are answered) 8.3  filed at 2025 1026     Caprini  DVT Assessment    No data to display  Modified Frailty Index    No data to display  [id number] Stroke Risk Points  Current as of just now        N/A 0 to 9 Points:      Last Change: N/A          The [id number] risk score (Lip GH, et al. 2009. © 2010 [employer]) quantifies the risk of stroke for a patient with atrial fibrillation. For patients without atrial fibrillation or under the age of 18 this score appears as N/A. Higher score values generally indicate higher risk of stroke.        This score is not applicable to this patient. Components are not calculated.          Revised Cardiac Risk Index    No data to display  Apfel Simplified Score    No data to display  Risk Analysis Index Results This Encounter    No data found in the last 10 encounters.       Stop Bang Score      Flowsheet Row Questionnaire Series Submission from 5/19/2025 in [facility] OR with Generic Provider Angy   Do you snore loudly? 0  filed at 05/19/2025 1026   Do you often feel tired or fatigued after your sleep? 0  filed at 05/19/2025 1026   Has anyone ever observed you stop breathing in your sleep? 0  filed at 05/19/2025 1026   Do you have or are you being treated for high blood pressure? 0  filed at 05/19/2025 1026   Recent BMI (Calculated) 23.7  filed at 05/19/2025 1026   Is BMI greater than 35 kg/m2? 0=No  filed at 05/19/2025 1026   Age older than 50 years old? 1=Yes  filed at 05/19/2025 1026   Gender - Male 1=Yes  filed at 05/19/2025 1026     Prodigy: High Risk  Total Score: 16              Prodigy Age Score      Prodigy Gender Score          ARISCAT Score for Postoperative Pulmonary Complications    No data to display  Harper Perioperative Risk for Myocardial Infarction or Cardiac Arrest (DENISSE)    No data to display      Nurse Plan of Action:   Med only RN screening call complete.  Reviewed allergies, medications and pharmacy as well as family and social history.               [1]   Past  Medical History:  Diagnosis Date    Abdominal distension (gaseous) 07/22/2016    Abdominal bloating    Flatulence 07/22/2016    Excessive flatus    HL (hearing loss) At birth    Migraine with aura, not intractable, without status migrainosus 04/19/2017    Ophthalmic migraine    Other specified symptoms and signs involving the digestive system and abdomen 07/22/2016    Change in bowel function    Personal history of other diseases of male genital organs     History of hydrocele    Personal history of other diseases of the musculoskeletal system and connective tissue     History of arthritis    Personal history of other diseases of the nervous system and sense organs 02/14/2017    History of migraine with aura    Transient cerebral ischemic attack, unspecified 04/19/2017    TIA (transient ischemic attack)    Unspecified visual disturbance 04/19/2017    Vision changes   [2]   Past Surgical History:  Procedure Laterality Date    CT ANGIO NECK  12/14/2016    CT NECK ANGIO W AND WO IV CONTRAST 12/14/2016 [facility]    CT HEAD ANGIO W AND WO IV CONTRAST  12/14/2016    CT HEAD ANGIO W AND WO IV CONTRAST 12/14/2016 [facility]    FRACTURE SURGERY  Broken left collarbone    JOINT REPLACEMENT  Right knee replacement    OTHER SURGICAL HISTORY  08/26/2014    Closed Treatment Of Clavicular Fracture    OTHER SURGICAL HISTORY  08/26/2014    Inner Ear Surgery Cochlear Device Implantation    OTHER SURGICAL HISTORY  11/12/2018    Cochlear implant surgery    OTHER SURGICAL HISTORY  11/12/2018    Knee arthroscopy   [3]   Family History  Problem Relation Name Age of Onset    Diabetes Mother Melissa Farr     Dementia Mother Melissa Farr     Dementia Father      Coronary artery disease Father      No Known Problems Sister      Epilepsy Brother      No Known Problems Brother     [4] No Known Allergies

## 2025-07-23 ENCOUNTER — PRE-ADMISSION TESTING (OUTPATIENT)
Dept: PREADMISSION TESTING | Facility: HOSPITAL | Age: 70
End: 2025-07-23
Payer: MEDICARE

## 2025-07-23 ENCOUNTER — LAB (OUTPATIENT)
Dept: LAB | Facility: HOSPITAL | Age: 70
End: 2025-07-23
Payer: MEDICARE

## 2025-07-23 VITALS
OXYGEN SATURATION: 96 % | WEIGHT: 169.75 LBS | HEIGHT: 73 IN | DIASTOLIC BLOOD PRESSURE: 56 MMHG | HEART RATE: 58 BPM | RESPIRATION RATE: 16 BRPM | TEMPERATURE: 97.6 F | BODY MASS INDEX: 22.5 KG/M2 | SYSTOLIC BLOOD PRESSURE: 105 MMHG

## 2025-07-23 DIAGNOSIS — N40.1 BENIGN PROSTATIC HYPERPLASIA WITH URINARY RETENTION: ICD-10-CM

## 2025-07-23 DIAGNOSIS — R33.8 BENIGN PROSTATIC HYPERPLASIA WITH URINARY RETENTION: ICD-10-CM

## 2025-07-23 DIAGNOSIS — D69.6 THROMBOCYTOPENIA: ICD-10-CM

## 2025-07-23 DIAGNOSIS — D69.6 THROMBOCYTOPENIA, UNSPECIFIED: Primary | ICD-10-CM

## 2025-07-23 DIAGNOSIS — Z01.818 PREOP TESTING: Primary | ICD-10-CM

## 2025-07-23 LAB
APPEARANCE UR: CLEAR
ATRIAL RATE: 69 BPM
BASOPHILS # BLD AUTO: 0.04 X10*3/UL (ref 0–0.1)
BASOPHILS NFR BLD AUTO: 0.7 %
BILIRUB UR STRIP.AUTO-MCNC: NEGATIVE MG/DL
COLOR UR: YELLOW
EOSINOPHIL # BLD AUTO: 0.31 X10*3/UL (ref 0–0.7)
EOSINOPHIL NFR BLD AUTO: 5.7 %
ERYTHROCYTE [DISTWIDTH] IN BLOOD BY AUTOMATED COUNT: 13 % (ref 11.5–14.5)
GLUCOSE UR STRIP.AUTO-MCNC: NORMAL MG/DL
HCT VFR BLD AUTO: 45.8 % (ref 41–52)
HGB BLD-MCNC: 14.8 G/DL (ref 13.5–17.5)
IMM GRANULOCYTES # BLD AUTO: 0.01 X10*3/UL (ref 0–0.7)
IMM GRANULOCYTES NFR BLD AUTO: 0.2 % (ref 0–0.9)
KETONES UR STRIP.AUTO-MCNC: NEGATIVE MG/DL
LEUKOCYTE ESTERASE UR QL STRIP.AUTO: ABNORMAL
LYMPHOCYTES # BLD AUTO: 1.18 X10*3/UL (ref 1.2–4.8)
LYMPHOCYTES NFR BLD AUTO: 21.8 %
MCH RBC QN AUTO: 29.2 PG (ref 26–34)
MCHC RBC AUTO-ENTMCNC: 32.3 G/DL (ref 32–36)
MCV RBC AUTO: 91 FL (ref 80–100)
MONOCYTES # BLD AUTO: 0.41 X10*3/UL (ref 0.1–1)
MONOCYTES NFR BLD AUTO: 7.6 %
MUCOUS THREADS #/AREA URNS AUTO: ABNORMAL /LPF
NEUTROPHILS # BLD AUTO: 3.47 X10*3/UL (ref 1.2–7.7)
NEUTROPHILS NFR BLD AUTO: 64 %
NITRITE UR QL STRIP.AUTO: NEGATIVE
NRBC BLD-RTO: 0 /100 WBCS (ref 0–0)
P AXIS: 78 DEGREES
P OFFSET: 207 MS
P ONSET: 147 MS
PH UR STRIP.AUTO: 5.5 [PH]
PLATELET # BLD AUTO: 146 X10*3/UL (ref 150–450)
PR INTERVAL: 152 MS
PROT UR STRIP.AUTO-MCNC: ABNORMAL MG/DL
Q ONSET: 223 MS
QRS COUNT: 12 BEATS
QRS DURATION: 68 MS
QT INTERVAL: 370 MS
QTC CALCULATION(BAZETT): 396 MS
QTC FREDERICIA: 387 MS
R AXIS: 65 DEGREES
RBC # BLD AUTO: 5.06 X10*6/UL (ref 4.5–5.9)
RBC # UR STRIP.AUTO: ABNORMAL MG/DL
RBC #/AREA URNS AUTO: >20 /HPF
SP GR UR STRIP.AUTO: 1.02
T AXIS: 63 DEGREES
T OFFSET: 408 MS
UROBILINOGEN UR STRIP.AUTO-MCNC: NORMAL MG/DL
VENTRICULAR RATE: 69 BPM
WBC # BLD AUTO: 5.4 X10*3/UL (ref 4.4–11.3)
WBC #/AREA URNS AUTO: >50 /HPF

## 2025-07-23 PROCEDURE — 36415 COLL VENOUS BLD VENIPUNCTURE: CPT

## 2025-07-23 PROCEDURE — 93005 ELECTROCARDIOGRAM TRACING: CPT

## 2025-07-23 PROCEDURE — 93010 ELECTROCARDIOGRAM REPORT: CPT | Performed by: INTERNAL MEDICINE

## 2025-07-23 PROCEDURE — 85025 COMPLETE CBC W/AUTO DIFF WBC: CPT

## 2025-07-23 PROCEDURE — 99204 OFFICE O/P NEW MOD 45 MIN: CPT

## 2025-07-23 PROCEDURE — 81001 URINALYSIS AUTO W/SCOPE: CPT

## 2025-07-23 PROCEDURE — 87086 URINE CULTURE/COLONY COUNT: CPT | Mod: AHULAB

## 2025-07-23 ASSESSMENT — ENCOUNTER SYMPTOMS
CONSTITUTIONAL NEGATIVE: 1
NECK NEGATIVE: 1
MUSCULOSKELETAL NEGATIVE: 1
NEUROLOGICAL NEGATIVE: 1
CARDIOVASCULAR NEGATIVE: 1
ENDOCRINE NEGATIVE: 1
GASTROINTESTINAL NEGATIVE: 1
RESPIRATORY NEGATIVE: 1
EYES NEGATIVE: 1

## 2025-07-23 NOTE — CPM/PAT H&P
[facility] Evaluation       Name: Jorge Farr (Jorge Farr)  /Age: 1955/69 y.o.     Visit Type:   In-Person       Chief Complaint: carcinoma in situ bladder    HPI      Date of Consult: 25    Referring Provider:  Dr. Kavon Hines    Date, Surgery, and Length:  25, Blue Light Cystoscopy; Bladder Biopsy, 60 minutes      Patient presents to [facility] for perioperative risk assessment prior to scheduled surgery. Pt with carcinoma in situ of bladder. H/o BPH s/p HoLEP with a positive bladder biopsy 25.        This note was created in part upon personal review of patient's medical records.        Pt denies any past history of anesthetic complications such as PONV, awareness, prolonged sedation, dental damage, aspiration, cardiac arrest, difficult intubation, difficult I.V. access or unexpected hospital admissions. No history of malignant hyperthermia and or pseudocholinesterase deficiency.    No history of blood transfusions.    The patient IS NOT a [employer] and will accept blood and blood products if medically indicated.     Type and screen WAS NOT sent.    Past Medical History:   Diagnosis Date    Abdominal distension (gaseous) 2016    Abdominal bloating    Flatulence 2016    Excessive flatus    HL (hearing loss) At birth    Migraine with aura, not intractable, without status migrainosus 2017    Ophthalmic migraine    Other specified symptoms and signs involving the digestive system and abdomen 2016    Change in bowel function    Personal history of other diseases of male genital organs     History of hydrocele    Personal history of other diseases of the musculoskeletal system and connective tissue     History of arthritis    Personal history of other diseases of the nervous system and sense organs 2017    History of migraine with aura    Transient cerebral ischemic attack, unspecified 2017    TIA (transient ischemic attack)    Unspecified visual  disturbance 04/19/2017    Vision changes       Past Surgical History:   Procedure Laterality Date    CT ANGIO NECK  12/14/2016    CT NECK ANGIO W AND WO [place] [facility] [place]    CT HEAD ANGIO W AND WO IV CONTRAST  12/14/2016    CT HEAD ANGIO W AND WO IV CONTRAST [place] [facility]    FRACTURE SURGERY  Broken left collarbone    JOINT REPLACEMENT  Right knee replacement    OTHER SURGICAL HISTORY  08/26/2014    Closed Treatment Of Clavicular Fracture    OTHER SURGICAL HISTORY  08/26/2014    Inner Ear Surgery Cochlear Device Implantation    OTHER SURGICAL HISTORY  11/12/2018    Cochlear implant surgery    OTHER SURGICAL HISTORY  11/12/2018    Knee arthroscopy     Family History   Problem Relation Name Age of Onset    Diabetes Mother Melissa Farr     Dementia Mother Melissamuna Farr     Dementia Father      Coronary artery disease Father      No Known Problems Sister      Epilepsy Brother      No Known Problems Brother       Social History     Tobacco Use   Smoking Status Never    Passive exposure: Never   Smokeless Tobacco Never     Social History     Substance and Sexual Activity   Alcohol Use Yes    Alcohol/week: 2.0 standard drinks of alcohol    Types: 1 Glasses of wine, 1 Cans of beer per week     Social History     Substance and Sexual Activity   Drug Use Never     No Known Allergies    No current outpatient medications       PAT ROS:   Constitutional:   neg    Neuro/Psych:   neg    Eyes:   neg    Ears:    Cochlear implant on left   hearing loss  Nose:   neg    Mouth:   neg    Throat:   neg    Neck:   neg    Cardio:   neg    Respiratory:   neg    Endocrine:   neg    GI:   neg    :   neg    Musculoskeletal:   neg    Hematologic:   neg    Skin:  neg        Physical Exam  Vitals reviewed.   Constitutional:       Appearance: Normal appearance.     Cardiovascular:      Rate and Rhythm: Normal rate and regular rhythm.      Heart sounds: No murmur heard.     No friction rub. No gallop.  "  Pulmonary:      Effort: Pulmonary effort is normal.      Breath sounds: No wheezing, rhonchi or rales.     Musculoskeletal:      Cervical back: Normal range of motion.     Neurological:      Mental Status: He is alert.          PAT AIRWAY:   Airway:     Mallampati::  II    Neck ROM::  Full  normal            Visit Vitals  /56   Pulse 58   Temp 36.4 °C (97.6 °F)   Resp 16   Ht 1.85 m (6' 0.84\")   Wt 77 kg (169 lb 12.1 oz)   SpO2 96%   BMI 22.50 kg/m²   Smoking Status Never   BSA 1.99 m²           Patient is a 69 y.o.  male scheduled for Blue Light Cystoscopy; Bladder Biopsy with Dr. Hines on 7/29/25.      Plan      Neuro:  No neurologic diagnosis, however, the patient is at increased risk for perioperative delirium secondary to  age, Patient instructed on and provided cognitive exercises  Patient is at increased risk for perioperative CVA secondary to  previous CVA/TIA, increased age        Cardiovascular:    RCRI: 1 Risk of Mace: 1.1%    Caprini:  5    Patient denies any chest pain, tightness, heaviness, pressure, radiating pain, palpitations, irregular heartbeats, lightheadedness, cough, congestion, shortness of breath, CONTRERAS, PND, near syncope, weight loss or gain.    Good functional capacity (>4 METS)      EKG in PAT NSR, rate 69 bpm, QT/Qtc 370/396        Pulmonary:  No pulmonary diagnosis, however patient is at increased risk of perioperative complications secondary to  age > 60  Stop Bang score is 2 placing patient at low risk for CHAVO  ARISCAT: <26 points, 1.6% risk of in-hospital postoperative pulmonary complication  PRODIGY: Moderate risk for opioid induced respiratory depression  Pumonary toilet education discussed, patient also provided deep breathing exercises and incentive spirometry educational handout          Heme:  Patient instructed to ambulate as soon as possible postoperatively to decrease thromboembolic risk.    Initiate mechanical DVT prophylaxis as soon as possible and initiate " chemical prophylaxis when deemed safe from a bleeding standpoint post surgery.              Risk assessment complete.  This patient is LOW risk candidate undergoing LOW risk procedure, patient is medically optimized for surgery.        Labs/testing obtained in PAT on 7/23/25: CBC, UA w/flex, EKG. BMP recently obtained 5/7/25 and copied below.    Lab Results   Component Value Date    GLUCOSE 121 (H) 05/07/2025    CALCIUM 8.8 05/07/2025     05/07/2025    K 4.1 05/07/2025    CO2 29 05/07/2025     05/07/2025    BUN 18 05/07/2025    CREATININE 0.99 05/07/2025     Lab Results   Component Value Date    WBC 5.4 07/23/2025    HGB 14.8 07/23/2025    HCT 45.8 07/23/2025    MCV 91 07/23/2025     (L) 07/23/2025       Follow up/communication: None      Preoperative medication instructions were provided and reviewed with the patient.  Any additional testing or evaluation was explained to the patient.  Nothing by mouth instructions were discussed and patient's questions were answered prior to conclusion to this encounter.  Patient verbalized understanding of preoperative instructions given in preadmission testing; discharge instructions available in EMR.    This note was dictated with speech recognition.  Minor errors may have been detected during use of speech recognition.

## 2025-07-23 NOTE — H&P (VIEW-ONLY)
[facility] Evaluation       Name: Jorge Farr (Jorge Farr)  /Age: 1955/69 y.o.     Visit Type:   In-Person       Chief Complaint: carcinoma in situ bladder    HPI      Date of Consult: 25    Referring Provider:  Dr. Kavon Hines    Date, Surgery, and Length:  25, Blue Light Cystoscopy; Bladder Biopsy, 60 minutes      Patient presents to [facility] for perioperative risk assessment prior to scheduled surgery. Pt with carcinoma in situ of bladder. H/o BPH s/p HoLEP with a positive bladder biopsy 25.        This note was created in part upon personal review of patient's medical records.        Pt denies any past history of anesthetic complications such as PONV, awareness, prolonged sedation, dental damage, aspiration, cardiac arrest, difficult intubation, difficult I.V. access or unexpected hospital admissions. No history of malignant hyperthermia and or pseudocholinesterase deficiency.    No history of blood transfusions.    The patient IS NOT a [employer] and will accept blood and blood products if medically indicated.     Type and screen WAS NOT sent.    Past Medical History:   Diagnosis Date    Abdominal distension (gaseous) 2016    Abdominal bloating    Flatulence 2016    Excessive flatus    HL (hearing loss) At birth    Migraine with aura, not intractable, without status migrainosus 2017    Ophthalmic migraine    Other specified symptoms and signs involving the digestive system and abdomen 2016    Change in bowel function    Personal history of other diseases of male genital organs     History of hydrocele    Personal history of other diseases of the musculoskeletal system and connective tissue     History of arthritis    Personal history of other diseases of the nervous system and sense organs 2017    History of migraine with aura    Transient cerebral ischemic attack, unspecified 2017    TIA (transient ischemic attack)    Unspecified visual  disturbance 04/19/2017    Vision changes       Past Surgical History:   Procedure Laterality Date    CT ANGIO NECK  12/14/2016    CT NECK ANGIO W AND WO [place] [facility]    CT HEAD ANGIO W AND WO IV CONTRAST  12/14/2016    CT HEAD ANGIO W AND WO IV CONTRAST [place] [facility] [place]    FRACTURE SURGERY  Broken left collarbone    JOINT REPLACEMENT  Right knee replacement    OTHER SURGICAL HISTORY  08/26/2014    Closed Treatment Of Clavicular Fracture    OTHER SURGICAL HISTORY  08/26/2014    Inner Ear Surgery Cochlear Device Implantation    OTHER SURGICAL HISTORY  11/12/2018    Cochlear implant surgery    OTHER SURGICAL HISTORY  11/12/2018    Knee arthroscopy     Family History   Problem Relation Name Age of Onset    Diabetes Mother Melissa Farr     Dementia Mother Melissamuna Farr     Dementia Father      Coronary artery disease Father      No Known Problems Sister      Epilepsy Brother      No Known Problems Brother       Social History     Tobacco Use   Smoking Status Never    Passive exposure: Never   Smokeless Tobacco Never     Social History     Substance and Sexual Activity   Alcohol Use Yes    Alcohol/week: 2.0 standard drinks of alcohol    Types: 1 Glasses of wine, 1 Cans of beer per week     Social History     Substance and Sexual Activity   Drug Use Never     No Known Allergies    No current outpatient medications       PAT ROS:   Constitutional:   neg    Neuro/Psych:   neg    Eyes:   neg    Ears:    Cochlear implant on left   hearing loss  Nose:   neg    Mouth:   neg    Throat:   neg    Neck:   neg    Cardio:   neg    Respiratory:   neg    Endocrine:   neg    GI:   neg    :   neg    Musculoskeletal:   neg    Hematologic:   neg    Skin:  neg        Physical Exam  Vitals reviewed.   Constitutional:       Appearance: Normal appearance.     Cardiovascular:      Rate and Rhythm: Normal rate and regular rhythm.      Heart sounds: No murmur heard.     No friction rub. No gallop.  "  Pulmonary:      Effort: Pulmonary effort is normal.      Breath sounds: No wheezing, rhonchi or rales.     Musculoskeletal:      Cervical back: Normal range of motion.     Neurological:      Mental Status: He is alert.          PAT AIRWAY:   Airway:     Mallampati::  II    Neck ROM::  Full  normal            Visit Vitals  /56   Pulse 58   Temp 36.4 °C (97.6 °F)   Resp 16   Ht 1.85 m (6' 0.84\")   Wt 77 kg (169 lb 12.1 oz)   SpO2 96%   BMI 22.50 kg/m²   Smoking Status Never   BSA 1.99 m²           Patient is a 69 y.o.  male scheduled for Blue Light Cystoscopy; Bladder Biopsy with Dr. Hines on 7/29/25.      Plan      Neuro:  No neurologic diagnosis, however, the patient is at increased risk for perioperative delirium secondary to  age, Patient instructed on and provided cognitive exercises  Patient is at increased risk for perioperative CVA secondary to  previous CVA/TIA, increased age        Cardiovascular:    RCRI: 1 Risk of Mace: 1.1%    Caprini:  5    Patient denies any chest pain, tightness, heaviness, pressure, radiating pain, palpitations, irregular heartbeats, lightheadedness, cough, congestion, shortness of breath, CONTRERAS, PND, near syncope, weight loss or gain.    Good functional capacity (>4 METS)      EKG in PAT NSR, rate 69 bpm, QT/Qtc 370/396        Pulmonary:  No pulmonary diagnosis, however patient is at increased risk of perioperative complications secondary to  age > 60  Stop Bang score is 2 placing patient at low risk for CHAVO  ARISCAT: <26 points, 1.6% risk of in-hospital postoperative pulmonary complication  PRODIGY: Moderate risk for opioid induced respiratory depression  Pumonary toilet education discussed, patient also provided deep breathing exercises and incentive spirometry educational handout          Heme:  Patient instructed to ambulate as soon as possible postoperatively to decrease thromboembolic risk.    Initiate mechanical DVT prophylaxis as soon as possible and initiate " chemical prophylaxis when deemed safe from a bleeding standpoint post surgery.              Risk assessment complete.  This patient is LOW risk candidate undergoing LOW risk procedure, patient is medically optimized for surgery.        Labs/testing obtained in PAT on 7/23/25: CBC, UA w/flex, EKG. BMP recently obtained 5/7/25 and copied below.    Lab Results   Component Value Date    GLUCOSE 121 (H) 05/07/2025    CALCIUM 8.8 05/07/2025     05/07/2025    K 4.1 05/07/2025    CO2 29 05/07/2025     05/07/2025    BUN 18 05/07/2025    CREATININE 0.99 05/07/2025     Lab Results   Component Value Date    WBC 5.4 07/23/2025    HGB 14.8 07/23/2025    HCT 45.8 07/23/2025    MCV 91 07/23/2025     (L) 07/23/2025       Follow up/communication: None      Preoperative medication instructions were provided and reviewed with the patient.  Any additional testing or evaluation was explained to the patient.  Nothing by mouth instructions were discussed and patient's questions were answered prior to conclusion to this encounter.  Patient verbalized understanding of preoperative instructions given in preadmission testing; discharge instructions available in EMR.    This note was dictated with speech recognition.  Minor errors may have been detected during use of speech recognition.

## 2025-07-23 NOTE — CPM/PAT NURSE NOTE
CPM/PAT Nurse Note      Name: Jorge Farr (Jorge Farr)  /Age: 1955/69 y.o.       Medical History[1]    Surgical History[2]    Patient  reports that he is not currently sexually active and has had partner(s) who are female. He reports using the following method of birth control/protection: None.    Family History[3]    Allergies[4]    Prior to Admission medications   Not on File        PAT ROS     DASI Risk Score      Flowsheet Row Questionnaire Series Submission from 2025 in [facility] [place] with Generic Provider Angy   Can you take care of yourself (eat, dress, bathe, or use toilet)?  2.75  filed at 2025 1026   Can you walk indoors, such as around your house? 1.75  filed at 2025 1026   Can you walk a block or two on level ground?  2.75  filed at 2025 1026   Can you climb a flight of stairs or walk up a hill? 5.5  filed at 2025 1026   Can you run a short distance? 8  filed at 2025 1026   Can you do light work around the house like dusting or washing dishes? 2.7  filed at 2025 1026   Can you do moderate work around the house like vacuuming, sweeping floors or carrying groceries? 3.5  filed at 2025 1026   Can you do heavy work around the house like scrubbing floors or lifting and moving heavy furniture?  8  filed at 2025 1026   Can you do yard work like raking leaves, weeding or pushing a mower? 4.5  filed at 2025 1026   Can you have sexual relations? 0  filed at 2025 1026   Can you participate in moderate recreational activities like golf, bowling, dancing, doubles tennis or throwing a baseball or football? 6  filed at 2025 1026   Can you participate in strenous sports like swimming, singles tennis, football, basketball, or skiing? 0  filed at 2025 1026   DASI SCORE 45.45  filed at 2025 1026   METS Score (Will be calculated only when all the questions are answered) 8.3  filed at 2025 1026     Caprini  DVT Assessment    No data to display  Modified Frailty Index    No data to display  [id number] Stroke Risk Points  Current as of just now        N/A 0 to 9 Points:      Last Change: N/A          The XEL2YO2-BUSp risk score (Lip GH, et al. 2009. © 2010 [employer]) quantifies the risk of stroke for a patient with atrial fibrillation. For patients without atrial fibrillation or under the age of 18 this score appears as N/A. Higher score values generally indicate higher risk of stroke.        This score is not applicable to this patient. Components are not calculated.          Revised Cardiac Risk Index    No data to display  Apfel Simplified Score    No data to display  Risk Analysis Index Results This Encounter    No data found in the last 10 encounters.       Stop Bang Score      Flowsheet Row Questionnaire Series Sub[place] from 5/19/20[place] in [facility] [place] with Generic Provider Angy   Do you snore loudly? 0  filed at 05/19/2025 1026   Do you often feel tired or fatigued after your sleep? 0  filed at 05/19/2025 1026   Has anyone ever observed you stop breathing in your sleep? 0  filed at 05/19/2025 1026   Do you have or are you being treated for high blood pressure? 0  filed at 05/19/2025 1026   Recent BMI (Calculated) 23.7  filed at 05/19/2025 1026   Is BMI greater than 35 kg/m2? 0=No  filed at 05/19/2025 1026   Age older than 50 years old? 1=Yes  filed at 05/19/2025 1026   Gender - Male 1=Yes  filed at 05/19/2025 1026     Prodigy: High Risk  Total Score: 16              Prodigy Age Score      Prodigy Gender Score          ARISCAT Score for Postoperative Pulmonary Complications    No data to display  Harper Perioperative Risk for Myocardial Infarction or Cardiac Arrest (DENISSE)    No data to display      Nurse Plan of Action:       After Visit Summary (AVS) reviewed and patient verbalized good understanding of medications and NPO instructions.            [1]   Past Medical  History:  Diagnosis Date    Abdominal distension (gaseous) 07/22/2016    Abdominal bloating    Flatulence 07/22/2016    Excessive flatus    HL (hearing loss) At birth    Migraine with aura, not intractable, without status migrainosus 04/19/2017    Ophthalmic migraine    Other specified symptoms and signs involving the digestive system and abdomen 07/22/2016    Change in bowel function    Personal history of other diseases of male genital organs     History of hydrocele    Personal history of other diseases of the musculoskeletal system and connective tissue     History of arthritis    Personal history of other diseases of the nervous system and sense organs 02/14/2017    History of migraine with aura    Transient cerebral ischemic attack, unspecified 04/19/2017    TIA (transient ischemic attack)    Unspecified visual disturbance 04/19/2017    Vision changes   [2]   Past Surgical History:  Procedure Laterality Date    CT ANGIO NECK  12/14/2016    CT NECK ANGIO W AND WO IV CONTRAST 12/14/2016 [facility] ANCILLARY [facility]GAC[facility]    CT HEAD ANGIO W AND WO IV CONTRAST  12/14/2016    CT HEAD ANGIO W AND WO IV CONTRAST 12/14/2016 [facility] ANCILL[facility]GAC[facility]    FRACTURE SURGERY  Broken left collarbone    JOINT REPLACEMENT  Right knee replacement    OTHER SURGICAL HISTORY  08/26/2014    Closed Treatment Of Clavicular Fracture    OTHER SURGICAL HISTORY  08/26/2014    Inner Ear Surgery Cochlear Device Implantation    OTHER SURGICAL HISTORY  11/12/2018    Cochlear implant surgery    OTHER SURGICAL HISTORY  11/12/2018    Knee arthroscopy   [3]   Family History  Problem Relation Name Age of Onset    Diabetes Mother Melissa Farr     Dementia Mother Melissa Farr     Dementia Father      Coronary artery disease Father      No Known Problems Sister      Epilepsy Brother      No Known Problems Brother     [4] No Known Allergies

## 2025-07-23 NOTE — PREPROCEDURE INSTRUCTIONS
Medication List      as of July 23, 2025  1:14 PM     You have not been prescribed any medications.                  Preoperative Deep Breathing Exercises  Why it is important to do deep breathing exercises before my surgery?  Deep breathing exercises strengthen your breathing muscles.  This helps you to recover after your surgery and decreases the chance of breathing complications.  How are the deep breathing exercises done?  Sit straight with your back supported.  Breathe in deeply and slowly through your nose. Your lower rib cage should expand and your abdomen may move forward.  Hold that breath for 3 to 5 seconds.  Breathe out through pursed lips, slowly and completely.  Rest and repeat 10 times every hour while awake.  Rest longer if you become dizzy or lightheaded.        CONTACT SURGEON'S OFFICE IF YOU DEVELOP:  * Fever = 100.4 F   * New respiratory symptoms (e.g. cough, shortness of breath, respiratory distress, sore throat)  * Recent loss of taste or smell  *Flu like symptoms such as headache, fatigue or gastrointestinal symptoms  * You develop any open sores, shingles, burning or painful urination   AND/OR:  * You no longer wish to have the surgery.  * Any other personal circumstances change that may lead to the need to cancel or defer this surgery.  *You were admitted to any hospital within one week of your planned procedure.    SMOKING:  *Quitting smoking can make a huge difference to your health and recovery from surgery.    *If you need help with quitting, call 6-845-QUIT-NOW.    THE DAY OF SURGERY:  *Do not eat any food after midnight the night before your surgery.   *YOU MUST drink 14 OUNCES of clear liquids TWO hours before your instructed ARRIVAL TIME to the hospital. This includes water, black tea/coffee (no milk or cream), apple juice, clear broth and electrolyte drinks (Gatorade).  Please avoid clear liquids that are red in color.   *You may chew gum/mints up to TWO hours before your  surgery/procedure.    SURGICAL TIME:  *You will be contacted between 2 p.m. and 6 p.m. the business day before your surgery with your arrival time.  *If you haven't received a call by 6pm, call [phone].  *Scheduled surgery times may change and you will be notified if this occurs-check your personal voicemail for any updates.    ON THE MORNING OF SURGERY:  *Wear comfortable, loose fitting clothing.   *Do not use moisturizers, creams, lotions or perfume.  *All jewelry and valuables should be left at home.  *Prosthetic devices such as contact lenses, hearing aids, dentures, eyelash extensions, hairpins and body piercing must be removed before surgery.    BRING WITH YOU:  *Photo ID and insurance card  *Current list of medications and allergies  *Pacemaker/Defibrillator/Heart stent cards  *CPAP machine and mask  *Slings/splints/crutches  *Copy of your complete Advanced Directive/DHPOA-if applicable  *Neurostimulator implant remote    PARKING AND ARRIVAL:  *Check in at the Main Entrance desk and let them know you are here for surgery.  *You will be directed to the 2nd floor surgical waiting area.    IF YOU ARE HAVING OUTPATIENT/SAME DAY SURGERY:  *A responsible adult MUST accompany you at the time of discharge and stay with you for 24 hours after your surgery.  *You may NOT drive yourself home after surgery.  *You may use a taxi or ride sharing service ([employer], Uber) to return home ONLY if you are accompanied by a friend or family member.  *Instructions for resuming your medications will be provided by your surgeon.        Preoperative Brain Exercises    What are brain exercises?  A brain exercise is any activity that engages your thinking (cognitive) skills.    What types of activities are considered brain exercises?  Jigsaw puzzles, crossword puzzles, word jumble, memory games, word search, and many more.  Many can be found free online or on your phone via a mobile lorie.    Why should I do brain exercises before my  surgery?  More recent research has shown brain exercise before surgery can lower the risk of postoperative delirium (confusion) which can be especially important for older adults.  Patients who did brain exercises for 5 to 10 hours (total) for the 7-10 days before surgery, cut their risk of postoperative delirium in half up to 1 week after surgery.

## 2025-07-25 LAB — BACTERIA UR CULT: NO GROWTH

## 2025-07-28 ENCOUNTER — ANESTHESIA EVENT (OUTPATIENT)
Dept: OPERATING ROOM | Facility: HOSPITAL | Age: 70
End: 2025-07-28
Payer: MEDICARE

## 2025-07-28 PROBLEM — Z86.69 HISTORY OF MIGRAINE HEADACHES: Status: ACTIVE | Noted: 2025-07-28

## 2025-07-28 PROBLEM — H54.7 VISION LOSS: Status: ACTIVE | Noted: 2025-07-28

## 2025-07-28 PROBLEM — M19.90 OSTEOARTHRITIS: Status: ACTIVE | Noted: 2025-07-28

## 2025-07-28 NOTE — ANESTHESIA PREPROCEDURE EVALUATION
Patient: Jorge Farr    Procedure Information       Date/Time: 07/29/25 1405    Procedure: Blue Light Cystoscopy; Bladder Biopsy (Bladder)    Location: [facility] A OR 09 / Virtual [facility] A OR    Surgeons: Kavon Hines MD            Relevant Problems   Anesthesia (within normal limits)      Cardiac (within normal limits)      Pulmonary (within normal limits)      Neuro  Hx TIA   (+) History of migraine headaches      GI (within normal limits)      /Renal   (+) Benign prostatic hyperplasia with urinary retention   (+) Enlarged prostate with urinary retention      Liver (within normal limits)      Endocrine (within normal limits)      Hematology   (+) Thrombocytopenia      Musculoskeletal   (+) Osteoarthritis      HEENT   (+) Sensorineural hearing loss, bilateral   (+) Vision loss                                                         Pre-Anesthesia Evaluation                                         Jorge Farr is a 69 y.o. male who presents for the above mentioned procedure due to Bladder CA in situ [D09.0]    Medical History[1]  Surgical History[2]  Social History[3]  RX Allergies[4]  Current Medications[5]  Prior to Admission medications   Not on File     No medication comments found.  Visit Vitals  Smoking Status Never                             7/23/2025     1:07 PM 5/20/2025     2:00 PM 5/20/2025     1:30 PM 5/20/2025     1:29 PM 5/20/2025     1:15 PM 5/20/2025     1:09 PM 5/20/2025     1:00 PM   BP REVIEW   BP (ultimate) 105/56 120/82 121/76 121/76 123/86 123/86 125/72     Recent Labs     07/23/25  1348 05/07/25  1337   WBC 5.4 6.6   HGB 14.8 14.9   HCT 45.8 44.3   * 141*   MCV 91 87     Recent Labs     05/07/25  1337 04/18/25  1003   EGFR 83 84   ANIONGAP 11 9   BUN 18 22   CREATININE 0.99 0.98    139   K 4.1 4.5    102   CO2 29 28   GLUCOSE 121* 86   CALCIUM 8.8 9.4     Recent Labs     12/20/24  0846 10/26/20  0756 10/22/19  0739   HGBA1C 5.3   < > 5.5   TSH  --   --  1.88    < > =  "values in this interval not displayed.     Recent Labs     12/20/24  0846 12/04/23  0943   BILITOT 0.7 0.7   PROT 7.1 6.9   ALBUMIN 4.5 4.4   ALKPHOS 61 69   ALT 20 23   AST 19 18     Recent Labs     12/15/20  1301   PROTIME 10.3   INR 0.9     No results found for: \"ABO\", \"LABRH\", \"ABSCRN\"  No results found for: \"FERRITIN\", \"TIBC\", \"IRONSAT\"  No results found for: \"STAPHMRSASCR\"  No results for input(s): \"AMPHETAMINE\", \"MAMPHBLDS\", \"BARBITURATE\", \"BENZODIAZ\", \"BUPRENBLDS\", \"CANNABBLDS\", \"COCBLDS\", \"METHABLDS\", \"OXYBLDS\", \"PCPBLDS\", \"OPIATBLDS\", \"DRBLDCOMM\" in the last 10129 hours.  No results for input(s): \"PHART\", \"AZQ0KWE\", \"PO2ART\", \"NCK1NZI\", \"J3BOPXPP\", \"BEART\", \"O4IIILGH\" in the last 91690 hours.        EKG   Encounter Date: 07/23/25   ECG 12 Lead   Result Value    Ventricular Rate 69    Atrial Rate 69    NC Interval 152    QRS Duration 68    QT Interval 370    QTC Calculation(Bazett) 396    P Axis 78    R Axis 65    T Axis 63    QRS Count 12    Q Onset 223    P Onset 147    P Offset 207    T Offset 408    QTC Fredericia 387    Narrative    Normal sinus rhythm  Normal ECG  When compared with ECG of 23-JUL-2025 13:13, (unconfirmed)  No significant change was found  Confirmed by Reji Fair ([id number]) on 7/23/2025 5:22:54 PM     Ejection Fractions:No results found for: \"EF\"  Echocardiogram   Echocardiogram     Narrative  [facility], [address] [place] [address], [address], [place] [address]  Tel [phone] and Fax [phone]    TRANSTHORACIC ECHOCARDIOGRAM REPORT      Patient Name:     JUNG C GOMEZ Reading Physician:   [id number] Prashant Augustin MD  Study Date:       1/5/2017       Referring Physician: Jon García MD  MRN/PID:          [id number]       PCP:  Accession/Order#: [id number]   Department Location:  YOB: 1955      Fellow:  Gender:           M              Nurse:  Admit Date:                      Sonographer:         Toma Sheppard [facility]  Admission Status: Outpatient     Additional " Staff:  Height:           182.88 cm      CC Report to:  Weight:           81.19 kg       Study Type:          Echocardiogram  BSA:              2.03 m2  Blood Pressure: 90 /40 mmHg    Diagnosis/ICD: G45.9 Transient cerebral ischemic attack, unspecified (NOT on  LCD)  Indication:    Transischemic Attack  Procedure/CPT: Echo Complete w/Full Doppler (54830)    Patient History:  Pertinent History: No previous cardiac history, visual disturbance x 2.    Study Detail: The following Echo studies were performed: 2D, M-Mode, Doppler and  color flow. Agitated saline used as a contrast agent for  intraseptal flow evaluation.      PHYSICIAN INTERPRETATION:  Left Ventricle: The left ventricular systolic function is normal, with an estimated ejection fraction of 60-65%. The left ventricular cavity size is normal. Spectral Doppler shows an impaired relaxation pattern of left ventricular diastolic filling.  Left Atrium: The left atrium is normal in size.  Right Ventricle: The right ventricle is normal in size. There is normal right ventricular global systolic function.  Right Atrium: The right atrium is normal in size.  Aortic Valve: The aortic valve is trileaflet. There is mild aortic valve cusp calcification. There is no evidence of aortic valve regurgitation. The peak instantaneous gradient of the aortic valve is 4.8 mmHg.  Mitral Valve: The mitral valve is mildly thickened. There is mitral valve prolapse. There is mild mitral valve regurgitation. Anterior MVP.  Tricuspid Valve: The tricuspid valve is structurally normal. There is mild tricuspid regurgitation.  Pulmonic Valve: The pulmonic valve is not well visualized. There is no indication of pulmonic valve regurgitation.  Pericardium: There is no pericardial effusion noted.  Aorta: The aortic root is normal.      CONCLUSIONS:  1. The left ventricular systolic function is normal with a 60-65% estimated ejection fraction.  2. Spectral Doppler shows an impaired relaxation  pattern of left ventricular diastolic filling.  3. The left atrium is normal in size.  4. Anterior MVP.    QUANTITATIVE DATA SUMMARY:  2D MEASUREMENTS:  Normal Ranges:  LAs:           3.43 cm   (2.7-4.0cm)  IVSd:          1.01 cm   (0.6-1.1cm)  LVPWd:         0.85 cm   (0.6-1.1cm)  LVIDd:         4.16 cm   (3.9-5.9cm)  LVIDs:         2.71 cm  LV Mass Index: 60.1 g/m2  LV % FS        35.0 %    LA VOLUME:  Normal Ranges:  LA Volume Index: 27.0 ml/m2    M-MODE MEASUREMENTS:  Normal Ranges:  LAs: 4.23 cm (2.7-4.0cm)    AORTA MEASUREMENTS:  Normal Ranges:  Asc Ao, d: 3.10 cm (2.1-3.4cm)  Ao Arch:   2.70 cm (2.0-3.6cm)    LV SYSTOLIC FUNCTION BY 2D PLANIMETRY (MOD):  Normal Ranges:  EF-A4C View: 61.2 % (>55%)  EF-A2C View: 67.4 %  EF-Biplane:  62.6 %    LV DIASTOLIC FUNCTION:  Normal Ranges:  MV Peak E:        0.56 m/s    (0.7-1.2 m/s)  MV Peak A:        0.58 m/s    (0.42-0.7 m/s)  E/A Ratio:        0.97        (1.0-2.2)  MV lateral e'     0.11 m/s  MV medial e'      0.09 m/s  MV A Dur:         96.89 msec  PulmV Sys Alex:    45.34 cm/s  PulmV Looney Alex:   17.57 cm/s  PulmV S/D Alex:    2.58  PulmV A Revs Alex: 27.98 cm/s  PulmV A Revs Dur: 115.34 msec    MITRAL VALVE:  Normal Ranges:  MV DT: 323 msec (150-240msec)    AORTIC VALVE:  Normal Ranges:  AoV Vmax:      1.09 m/s (<1.7m/s)  AoV Peak P.8 mmHg (<20mmHg)  LVOT Max Alex:  0.90 m/s (<1.1m/s)  LVOT VTI:      17.82 cm  LVOT Diameter: 2.10 cm  (1.8-2.4cm)  AoV Area,Vmax: 2.83 cm2 (2.5-4.5cm2)    RIGHT VENTRICLE:  RV 1   3.1 cm  RV 2   2.3 cm  RV 3   5.7 cm  TAPSE: 23.0 mm  RV s'  0.15 m/s    PULMONIC VALVE:  Normal Ranges:  PV Max Alex: 1.0 m/s  (0.6-0.9m/s)  PV Max PG:  3.9 mmHg    Pulmonary Veins:  PulmV A Revs Dur: 115.34 msec  PulmV A Revs Alex: 27.98 cm/s  PulmV Looney Alex:   17.57 cm/s  PulmV S/D Alex:    2.58  PulmV Sys Alex:    45.34 cm/s      [id number] Prashant Augustin MD  Electronically signed on 2017 at 12:06:57 PM             Stress TestingNo results found for this or  "any previous visit from the past 13507 days.    Cardiac CatheterizationNo results found for this or any previous visit from the past 90380 days.    Cardiac Scoring No results found for this or any previous visit from the past 80694 days.    AAA screenNo results found for this or any previous visit from the past 82106 days.    Carotid DopplerNo results found for this or any previous visit from the past 89483 days.    X Ray === 01/30/25 ===    XR ABDOMEN 2 VIEWS SUPINE AND ERECT OR DECUB    - Impression -  1.  Nonobstructive bowel gas pattern    MACRO:  None    Signed by: Cl Bush 1/31/2025 9:21 PM  Dictation workstation:   [id number]  Pulmonary Function Tests  No results found for: \"FEV1\", \"FVC\", \"WGI9SDO\", \"TLC\", \"DLCO\"   OTHER: No results found for this or any previous visit from the past 1825 days.    .      No results found for: \"PREGTESTUR\", \"PREGSERUM\", \"HCG\", \"HCGQUANT\"  The 10-year ASCVD risk score (Yas MORIN, et al., 2019) is: 10.6%    Values used to calculate the score:      Age: 69 years      Clincally relevant sex: Male      Is Non- : No      Diabetic: No      Tobacco smoker: No      Systolic Blood Pressure: 105 mmHg      Is BP treated: No      HDL Cholesterol: 46.7 mg/dL      Total Cholesterol: 165 mg/dL    Code Status: Full Code                   Clinical information reviewed:                   NPO Detail:  No data recorded     Physical Exam    Airway  Mallampati: I     Cardiovascular    Dental    Pulmonary    Abdominal            Anesthesia Plan    History of general anesthesia?: yes  History of complications of general anesthesia?: no    ASA 3     general     intravenous induction   Anesthetic plan and risks discussed with patient.                   [1]  Past Medical History:  Diagnosis Date   • Abdominal distension (gaseous) 07/22/2016    Abdominal bloating   • Flatulence 07/22/2016    Excessive flatus   • HL (hearing loss) At birth   • Migraine with aura, not " intractable, without status migrainosus 04/19/2017    Ophthalmic migraine   • Other specified symptoms and signs involving the digestive system and abdomen 07/22/2016    Change in bowel function   • Personal history of other diseases of male genital organs     History of hydrocele   • Personal history of other diseases of the musculoskeletal system and connective tissue     History of arthritis   • Personal history of other diseases of the nervous system and sense organs 02/14/2017    History of migraine with aura   • Transient cerebral ischemic attack, unspecified 04/19/2017    TIA (transient ischemic attack)   • Unspecified visual disturbance 04/19/2017    Vision changes   [2]  Past Surgical History:  Procedure Laterality Date   • CT ANGIO NECK  12/14/2016    CT NECK ANGIO W AND WO IV CONTRAST 12/14/2016 [facility]   • CT HEAD ANGIO W AND WO IV CONTRAST  12/14/2016    CT HEAD ANGIO W AND WO IV CONTRAST 12/14/2016 [facility]   • FRACTURE SURGERY  Broken left collarbone   • JOINT REPLACEMENT  Right knee replacement   • OTHER SURGICAL HISTORY  08/26/2014    Closed Treatment Of Clavicular Fracture   • OTHER SURGICAL HISTORY  08/26/2014    Inner Ear Surgery Cochlear Device Implantation   • OTHER SURGICAL HISTORY  11/12/2018    Cochlear implant surgery   • OTHER SURGICAL HISTORY  11/12/2018    Knee arthroscopy   [3]  Social History  Tobacco Use   • Smoking status: Never     Passive exposure: Never   • Smokeless tobacco: Never   Vaping Use   • Vaping status: Never Used   Substance Use Topics   • Alcohol use: Yes     Alcohol/week: 2.0 standard drinks of alcohol     Types: 1 Glasses of wine, 1 Cans of beer per week   • Drug use: Never   [4]  No Known Allergies  [5]  No current facility-administered medications for this encounter.  No current outpatient medications on file.

## 2025-07-29 ENCOUNTER — ANESTHESIA (OUTPATIENT)
Dept: OPERATING ROOM | Facility: HOSPITAL | Age: 70
End: 2025-07-29
Payer: MEDICARE

## 2025-07-29 ENCOUNTER — HOSPITAL ENCOUNTER (OUTPATIENT)
Facility: HOSPITAL | Age: 70
Setting detail: OUTPATIENT SURGERY
Discharge: HOME | End: 2025-07-29
Attending: UROLOGY | Admitting: UROLOGY
Payer: MEDICARE

## 2025-07-29 VITALS
HEART RATE: 60 BPM | SYSTOLIC BLOOD PRESSURE: 124 MMHG | DIASTOLIC BLOOD PRESSURE: 84 MMHG | TEMPERATURE: 96.6 F | RESPIRATION RATE: 15 BRPM | OXYGEN SATURATION: 100 %

## 2025-07-29 DIAGNOSIS — D09.0 BLADDER CA IN SITU: ICD-10-CM

## 2025-07-29 DIAGNOSIS — C67.0 MALIGNANT NEOPLASM OF TRIGONE OF URINARY BLADDER (MULTI): Primary | ICD-10-CM

## 2025-07-29 PROCEDURE — A52204 PR CYSTOURETHROSCOPY,BIOPSY: Performed by: ANESTHESIOLOGIST ASSISTANT

## 2025-07-29 PROCEDURE — 3700000001 HC GENERAL ANESTHESIA TIME - INITIAL BASE CHARGE: Performed by: UROLOGY

## 2025-07-29 PROCEDURE — 7100000001 HC RECOVERY ROOM TIME - INITIAL BASE CHARGE: Performed by: UROLOGY

## 2025-07-29 PROCEDURE — 52204 CYSTOSCOPY W/BIOPSY(S): CPT | Performed by: UROLOGY

## 2025-07-29 PROCEDURE — 2500000005 HC RX 250 GENERAL PHARMACY W/O HCPCS: Performed by: UROLOGY

## 2025-07-29 PROCEDURE — 7100000009 HC PHASE TWO TIME - INITIAL BASE CHARGE: Performed by: UROLOGY

## 2025-07-29 PROCEDURE — 3700000002 HC GENERAL ANESTHESIA TIME - EACH INCREMENTAL 1 MINUTE: Performed by: UROLOGY

## 2025-07-29 PROCEDURE — 2500000004 HC RX 250 GENERAL PHARMACY W/ HCPCS (ALT 636 FOR OP/ED): Performed by: UROLOGY

## 2025-07-29 PROCEDURE — G0416 PROSTATE BIOPSY, ANY MTHD: HCPCS | Mod: TC,AHULAB | Performed by: UROLOGY

## 2025-07-29 PROCEDURE — A9589 INSTI HEXAMINOLEVULINATE HCL: HCPCS | Performed by: UROLOGY

## 2025-07-29 PROCEDURE — 7100000010 HC PHASE TWO TIME - EACH INCREMENTAL 1 MINUTE: Performed by: UROLOGY

## 2025-07-29 PROCEDURE — 7100000002 HC RECOVERY ROOM TIME - EACH INCREMENTAL 1 MINUTE: Performed by: UROLOGY

## 2025-07-29 PROCEDURE — 3600000003 HC OR TIME - INITIAL BASE CHARGE - PROCEDURE LEVEL THREE: Performed by: UROLOGY

## 2025-07-29 PROCEDURE — 3600000008 HC OR TIME - EACH INCREMENTAL 1 MINUTE - PROCEDURE LEVEL THREE: Performed by: UROLOGY

## 2025-07-29 PROCEDURE — 2500000004 HC RX 250 GENERAL PHARMACY W/ HCPCS (ALT 636 FOR OP/ED): Performed by: ANESTHESIOLOGIST ASSISTANT

## 2025-07-29 PROCEDURE — A52204 PR CYSTOURETHROSCOPY,BIOPSY: Performed by: ANESTHESIOLOGY

## 2025-07-29 PROCEDURE — G0416 PROSTATE BIOPSY, ANY MTHD: HCPCS | Performed by: STUDENT IN AN ORGANIZED HEALTH CARE EDUCATION/TRAINING PROGRAM

## 2025-07-29 RX ORDER — MEPERIDINE HYDROCHLORIDE 25 MG/ML
12.5 INJECTION INTRAMUSCULAR; INTRAVENOUS; SUBCUTANEOUS EVERY 10 MIN PRN
Status: DISCONTINUED | OUTPATIENT
Start: 2025-07-29 | End: 2025-07-29 | Stop reason: HOSPADM

## 2025-07-29 RX ORDER — PROPOFOL 10 MG/ML
INJECTION, EMULSION INTRAVENOUS AS NEEDED
Status: DISCONTINUED | OUTPATIENT
Start: 2025-07-29 | End: 2025-07-29

## 2025-07-29 RX ORDER — CEFAZOLIN 1 G/1
INJECTION, POWDER, FOR SOLUTION INTRAVENOUS AS NEEDED
Status: DISCONTINUED | OUTPATIENT
Start: 2025-07-29 | End: 2025-07-29

## 2025-07-29 RX ORDER — ONDANSETRON HYDROCHLORIDE 2 MG/ML
4 INJECTION, SOLUTION INTRAVENOUS ONCE AS NEEDED
Status: DISCONTINUED | OUTPATIENT
Start: 2025-07-29 | End: 2025-07-29 | Stop reason: HOSPADM

## 2025-07-29 RX ORDER — PHENAZOPYRIDINE HYDROCHLORIDE 200 MG/1
200 TABLET, FILM COATED ORAL 3 TIMES DAILY PRN
Qty: 15 TABLET | Refills: 0 | Status: SHIPPED | OUTPATIENT
Start: 2025-07-29 | End: 2025-08-03

## 2025-07-29 RX ORDER — ONDANSETRON HYDROCHLORIDE 2 MG/ML
INJECTION, SOLUTION INTRAVENOUS AS NEEDED
Status: DISCONTINUED | OUTPATIENT
Start: 2025-07-29 | End: 2025-07-29

## 2025-07-29 RX ORDER — PHENYLEPHRINE HCL IN 0.9% NACL 1 MG/10 ML
SYRINGE (ML) INTRAVENOUS AS NEEDED
Status: DISCONTINUED | OUTPATIENT
Start: 2025-07-29 | End: 2025-07-29

## 2025-07-29 RX ORDER — CEFAZOLIN SODIUM 2 G/50ML
2 SOLUTION INTRAVENOUS ONCE
Status: DISCONTINUED | OUTPATIENT
Start: 2025-07-29 | End: 2025-07-29 | Stop reason: HOSPADM

## 2025-07-29 RX ORDER — WATER 1 ML/ML
INJECTION IRRIGATION AS NEEDED
Status: DISCONTINUED | OUTPATIENT
Start: 2025-07-29 | End: 2025-07-29 | Stop reason: HOSPADM

## 2025-07-29 RX ORDER — MIDAZOLAM HYDROCHLORIDE 2 MG/2ML
INJECTION, SOLUTION INTRAMUSCULAR; INTRAVENOUS AS NEEDED
Status: DISCONTINUED | OUTPATIENT
Start: 2025-07-29 | End: 2025-07-29

## 2025-07-29 RX ORDER — LIDOCAINE HYDROCHLORIDE 20 MG/ML
INJECTION, SOLUTION INFILTRATION; PERINEURAL AS NEEDED
Status: DISCONTINUED | OUTPATIENT
Start: 2025-07-29 | End: 2025-07-29

## 2025-07-29 RX ORDER — SODIUM CHLORIDE, SODIUM LACTATE, POTASSIUM CHLORIDE, CALCIUM CHLORIDE 600; 310; 30; 20 MG/100ML; MG/100ML; MG/100ML; MG/100ML
INJECTION, SOLUTION INTRAVENOUS CONTINUOUS PRN
Status: DISCONTINUED | OUTPATIENT
Start: 2025-07-29 | End: 2025-07-29

## 2025-07-29 RX ORDER — LIDOCAINE HYDROCHLORIDE 10 MG/ML
0.1 INJECTION, SOLUTION EPIDURAL; INFILTRATION; INTRACAUDAL; PERINEURAL ONCE
Status: DISCONTINUED | OUTPATIENT
Start: 2025-07-29 | End: 2025-07-29 | Stop reason: HOSPADM

## 2025-07-29 RX ORDER — SODIUM CHLORIDE, SODIUM LACTATE, POTASSIUM CHLORIDE, CALCIUM CHLORIDE 600; 310; 30; 20 MG/100ML; MG/100ML; MG/100ML; MG/100ML
100 INJECTION, SOLUTION INTRAVENOUS CONTINUOUS
Status: DISCONTINUED | OUTPATIENT
Start: 2025-07-29 | End: 2025-07-29 | Stop reason: HOSPADM

## 2025-07-29 RX ORDER — FENTANYL CITRATE 50 UG/ML
INJECTION, SOLUTION INTRAMUSCULAR; INTRAVENOUS AS NEEDED
Status: DISCONTINUED | OUTPATIENT
Start: 2025-07-29 | End: 2025-07-29

## 2025-07-29 RX ORDER — OXYCODONE HYDROCHLORIDE 5 MG/1
5 TABLET ORAL EVERY 4 HOURS PRN
Status: DISCONTINUED | OUTPATIENT
Start: 2025-07-29 | End: 2025-07-29 | Stop reason: HOSPADM

## 2025-07-29 RX ORDER — METOCLOPRAMIDE HYDROCHLORIDE 5 MG/ML
10 INJECTION INTRAMUSCULAR; INTRAVENOUS ONCE AS NEEDED
Status: DISCONTINUED | OUTPATIENT
Start: 2025-07-29 | End: 2025-07-29 | Stop reason: HOSPADM

## 2025-07-29 RX ADMIN — CEFAZOLIN 2 G: 1 INJECTION, POWDER, FOR SOLUTION INTRAMUSCULAR; INTRAVENOUS at 14:50

## 2025-07-29 RX ADMIN — HEXAMINOLEVULINATE HYDROCHLORIDE 50 ML: KIT at 14:10

## 2025-07-29 RX ADMIN — LIDOCAINE HYDROCHLORIDE 80 MG: 20 INJECTION, SOLUTION INFILTRATION; PERINEURAL at 14:49

## 2025-07-29 RX ADMIN — Medication: at 15:41

## 2025-07-29 RX ADMIN — MIDAZOLAM HYDROCHLORIDE 2 MG: 1 INJECTION, SOLUTION INTRAMUSCULAR; INTRAVENOUS at 14:42

## 2025-07-29 RX ADMIN — FENTANYL CITRATE 50 MCG: 50 INJECTION, SOLUTION INTRAMUSCULAR; INTRAVENOUS at 15:25

## 2025-07-29 RX ADMIN — SODIUM CHLORIDE, SODIUM LACTATE, POTASSIUM CHLORIDE, AND CALCIUM CHLORIDE: .6; .31; .03; .02 INJECTION, SOLUTION INTRAVENOUS at 14:40

## 2025-07-29 RX ADMIN — FENTANYL CITRATE 50 MCG: 50 INJECTION, SOLUTION INTRAMUSCULAR; INTRAVENOUS at 14:49

## 2025-07-29 RX ADMIN — PROPOFOL 160 MG: 10 INJECTION, EMULSION INTRAVENOUS at 14:49

## 2025-07-29 RX ADMIN — Medication 100 MCG: at 14:53

## 2025-07-29 RX ADMIN — ONDANSETRON 4 MG: 2 INJECTION, SOLUTION INTRAMUSCULAR; INTRAVENOUS at 15:15

## 2025-07-29 RX ADMIN — DEXAMETHASONE SODIUM PHOSPHATE 8 MG: 4 INJECTION, SOLUTION INTRAMUSCULAR; INTRAVENOUS at 14:49

## 2025-07-29 RX ADMIN — PROPOFOL 40 MG: 10 INJECTION, EMULSION INTRAVENOUS at 14:57

## 2025-07-29 RX ADMIN — Medication 100 MCG: at 15:17

## 2025-07-29 ASSESSMENT — PAIN SCALES - GENERAL
PAINLEVEL_OUTOF10: 0 - NO PAIN

## 2025-07-29 ASSESSMENT — PAIN - FUNCTIONAL ASSESSMENT
PAIN_FUNCTIONAL_ASSESSMENT: 0-10
PAIN_FUNCTIONAL_ASSESSMENT: UNABLE TO SELF-REPORT
PAIN_FUNCTIONAL_ASSESSMENT: UNABLE TO SELF-REPORT

## 2025-07-29 ASSESSMENT — COLUMBIA-SUICIDE SEVERITY RATING SCALE - C-SSRS
6. HAVE YOU EVER DONE ANYTHING, STARTED TO DO ANYTHING, OR PREPARED TO DO ANYTHING TO END YOUR LIFE?: NO
2. HAVE YOU ACTUALLY HAD ANY THOUGHTS OF KILLING YOURSELF?: NO
1. IN THE PAST MONTH, HAVE YOU WISHED YOU WERE DEAD OR WISHED YOU COULD GO TO SLEEP AND NOT WAKE UP?: NO

## 2025-07-29 NOTE — ANESTHESIA PROCEDURE NOTES
Airway  Date/Time: 7/29/2025 2:51 PM  Reason: elective    Airway not difficult    Staffing  Performed: MANOJ   Authorized by: Charlie Giang MD    Performed by: JAIRO Hidalgo  Patient location during procedure: OR    Patient Condition  Indications for airway management: anesthesia  Patient position: sniffing  Sedation level: deep     Final Airway Details   Preoxygenated: yes  Final airway type: supraglottic airway  Successful airway:   Size: 4  Number of attempts at approach: 1

## 2025-07-29 NOTE — ANESTHESIA POSTPROCEDURE EVALUATION
Patient: Jorge Farr    Procedure Summary       Date: 07/29/25 Room / Location: [facility] A OR 04 / Virtual [facility] A OR    Anesthesia Start: 1440 Anesthesia Stop: 1543    Procedure: Blue Light Cystoscopy; Bladder Biopsy (Bladder) Diagnosis:       Bladder CA in situ      (Bladder CA in situ [D09.0])    Surgeons: Kavon Hines MD Responsible Provider: Daniel Monahan MD    Anesthesia Type: general ASA Status: 3            Anesthesia Type: general    Vitals Value Taken Time   /84 07/29/25 15:46   Temp 32.9 07/29/25 15:48   Pulse 69 07/29/25 15:48   Resp 12 07/29/25 15:48   SpO2 94 % 07/29/25 15:48   Vitals shown include unfiled device data.    Anesthesia Post Evaluation    Patient location during evaluation: PACU  Patient participation: complete - patient participated  Level of consciousness: awake  Pain management: adequate  Airway patency: patent  Cardiovascular status: acceptable  Respiratory status: acceptable  Hydration status: acceptable  Postoperative Nausea and Vomiting: none        No notable events documented.

## 2025-07-29 NOTE — DISCHARGE INSTRUCTIONS
DEPARTMENT OF UROLOGY  DISCHARGE INSTRUCTIONS TURBT/BIOPSY  Outpatient Surgery    C O N F I D E N T I A L   I N F O R M A T I O N    Jorge Farr      Call [phone] during regular daytime business hours (8:00 am - 5:00 pm) and after 5:00 pm and ask for the Urology resident with any questions or concerns.      If it is a life-threatening situation, proceed to the nearest emergency department.        Follow-up appointment:    Future Appointments   Date Time Provider Department Center   8/28/2025 10:00 AM Kavon Hines MD [facility]   12/29/2025  1:00 PM Poncho Lincoln MD [id number]          Thank you for the opportunity to care for you today.  Your health and healing are very important to us.  We hope we made you feel as comfortable as possible and are committed to your recovery and continued well-being.      The following is a brief overview of your transurethral bladder tumor resection/biopsy today. Some of the information contained on this summary may be confidential.  This information should be kept in your records and should be shared with your regular doctor.    Physicians:   Dr. Hines      Procedure performed: bladder tumor resection/biopsy  Pending results:   pathology of tissue taken from your bladder (we will go over these results at your post-operative appointment.)    What to Expect During your Recovery and Home Care  Anesthesia Side Effects   You received anesthesia today.  You may feel sleepy, tired, or have a sore throat.   You may also feel drowsiness, dizziness, or inability to think clearly.  For your safety, do not drive, drink alcoholic beverages, take any unprescribed medication or make any important decisions for 24 hours.  A responsible adult should be with you for 24 hours.        Activity and Recovery    No heavy lifting over ten pounds. Limit activity if you have a urinary catheter in place. Avoid activities that would cause pulling or tugging on your catheter.    Do not drive  or operate heavy machinery while taking narcotic pain medications as these medications can alter perception, impair judgement, and slow reaction times.      Pain Control  Unfortunately, you may experience pain after your procedure.  Adequate management can include alternative measures to help ease your pain and can include over the counter Tylenol. Do not take more than 4,000mg of Tylenol in a 24-hour period.      You may also experience bladder spasms due to the catheter. Ditropan may be prescribed to help alleviate this problem.    Nausea/Vomiting   Clear liquids are best tolerated at first. Start slow, advance your diet as tolerated to normal foods. Avoid spicy, greasy, heavy foods at first. Also, you may feel nauseous or like you need to vomit if you take any type of medication on an empty stomach.  Call your physician if you are unable to eat or drink and have persistent vomiting.    Signs of Bleeding   You are going to have some blood in your urine. Your urine will be light pink to yellow. If you have a catheter you always want to look at the urine in the tubing of your catheter and not in the large urine collection bag to check for bleeding. If urine becomes thick dark tj red, has large clots or stops draining, please notify your physician.    Treatment/wound care:   Keep area(s) clean and dry. Clean around insertion site of catheter daily with mild soap and water.  It is okay to shower 24 hours after time of surgery.    Do not submerge your catheter in standing water until seen for follow up appointment (no tub bathing, swimming, or hot tubs).      Signs of Infection  Signs of infection can include fever, drainage(green/yellow), chills, burning sensation with passing of urine, catheter leakage, or severe abdominal pain.  If you see any of these occur, please contact your doctor's office at [phone].  Any fever higher than 100.4, especially if associated with an ill feeling, abdominal pain, chills, or  nausea should be reported to your surgeon.          Assist in bowel movements/urination  Increase fiber in diet  Increase water (6 to 8 glasses)  Increase walking   Urination should occur within 6 hours of anesthesia  If you have tried these methods and your bladder still feels full and you cannot use the bathroom, please go to your nearest Emergency room.    Additional Instructions: CATHETER CARE  Always keep the catheters tubing and drainage bag below the level of your bladder.  Avoid loops and kinks in the catheter tubing.  NOTIFY your physician if catheter falls out or catheter seems clogged and urine is not draining.   Do not wear the small leg bag to bed you should be provided with a larger overnight bag that you should wear to bed and can hang over the side of the bed.  We recommend wearing the large bag in the shower as well as this is easy to dry, and you do not get your leg straps wet from your leg bag.   Your catheter should be secured to your upper thigh, do not allow it to hang or dangle.  Your catheter will be removed at your post-operative appointment.

## 2025-07-29 NOTE — OP NOTE
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]     Procedures  Blue Light Cystoscopy; Bladder Biopsy  85768 - RI CYSTOURETHROSCOPY WITH BIOPSY      Surgeons      * Kavon Hines - Primary    Resident/Fellow/Other Assistant:  Surgeons and Role:     * Pia Troy MD - Resident - Assisting    Staff:   Circulator: Kerry Antunez Person: Mariya  Relief Circulator: Amanda    Anesthesia Staff: Anesthesiologist: Charlie Giang MD; Daniel Monahan MD  C-AA: JAIRO Hidalgo    Procedure Summary  Anesthesia: General  ASA: III  Estimated Blood Loss: nil mL  Intra-op Medications:   Administrations occurring from 1422 to 1522 on 25:   Medication Name Total Dose   sterile water irrigation solution 6,000 mL   ceFAZolin (Ancef) vial 1 g 2 g   dexAMETHasone (Decadron) 4 mg/mL IV Syringe 2 mL 8 mg   fentaNYL (Sublimaze) injection 50 mcg/mL 50 mcg   lactated Ringer's infusion Cannot be calculated   lidocaine (Xylocaine) injection 2 % 80 mg   midazolam PF (Versed) injection 1 mg/mL 2 mg   ondansetron (Zofran) 2 mg/mL injection 4 mg   phenylephrine 100 mcg/mL syringe 10 mL (prefilled) 200 mcg   propofol (Diprivan) injection 10 mg/mL 200 mg              Anesthesia Record               Intraprocedure I/O Totals       None           Specimen:   ID Type Source Tests Collected by Time   1 : RIGHT LATERAL WALL Tissue BLADDER BIOPSY SURGICAL PATHOLOGY EXAM Kavon Hines MD 2025 1504   2 : LEFT LATERAL WALL Tissue BLADDER BIOPSY SURGICAL PATHOLOGY EXAM Kavon Hines MD 2025 1504   3 : RIGHT TRIGONE Tissue BLADDER BIOPSY SURGICAL PATHOLOGY EXAM Kavon Hines MD 2025 1504   4 : LEFT TRIGONE Tissue BLADDER BIOPSY SURGICAL PATHOLOGY EXAM Kavon Hines MD 2025 1504   5 :  POSTERIOR WALL Tissue BLADDER BIOPSY SURGICAL PATHOLOGY EXAM Kavon Hines MD 7/29/2025 150   6 : PROSTATE BIOPSY Tissue PROSTATE BIOPSY TARGETED ANIKET SURGICAL PATHOLOGY EXAM Kavon Hines MD 7/29/2025 1511                 Drains and/or Catheters: * None in log *    Tourniquet Times:         Implants:     Findings: 1. Nice prostate defect  2. Bullous edema throughout trigone and bladder neck, most c/w recent surgery  3. No obvious lesion on white light cystoscopy  4. Enhancement under blue light of bilateral trigone and bladder neck, difficult to say true enhancement vs tangential. No other obvious blue light enhancement throughout the bladder    Indications: Jorge Farr is an 69 y.o. 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     The patient was seen in the preoperative area. The risks, benefits, complications, treatment options, non-operative alternatives, expected recovery and outcomes were discussed with the patient. The possibilities of reaction to medication, pulmonary aspiration, injury to surrounding structures, bleeding, recurrent infection, the need for additional procedures, failure to diagnose a condition, and creating a complication requiring transfusion or operation were discussed with the patient. The patient concurred with the proposed plan, giving informed consent.  The site of surgery was properly noted/marked if necessary per policy. The patient has been actively warmed in preoperative area. Preoperative antibiotics have been ordered and given within 1 hours of incision. Venous thrombosis prophylaxis have been ordered including bilateral sequential compression devices    Procedure Details:   The patient was brought to the OR and after good general anesthesia was achieved, the patient was prepped and draped in dorsal lithotomy position. All pressure points were padded.  Surgical pause was performed.  The patient was identified using 2 identifiers.  The correct surgical  procedure was confirmed.  All members of surgical and anesthesia team were in agreement.  The patient received prophylactic antibiotic and the procedure started.    Cystoscopy: The patient's bladder was first entered with a 22-Italian rigid cystoscope with 30-degree lens.  Cystoscopic examination showed normal anterior urethra.  The posterior urethra showed a nice holep defect.    Bladder was then carefully inspected under white light. There was bullous edema throughout trigone and bladder neck, most c/w recent surgery. No other lesions seen throughout the bladder.    We then switched to blue light. There was enhancement under blue light of bilateral trigone and bladder neck, difficult to say true enhancement vs tangential. No other obvious blue light enhancement throughout the bladder.    Using a rigid biopsy forceps, we took biopsies of the right lateral wall, right trigone, posterior wall, left lateral wall, left trigone, and prostatic urethra. We then used bugbee electrocautery to obtain hemostasis.     Bladder was drained and instruments were removed.    The patient tolerated the procedure well and was shifted to recovery in stable condition.   Evidence of Infection: No   Complications:  None; patient tolerated the procedure well.    Disposition: PACU - hemodynamically stable.  Condition: stable                 Additional Details:     Attending Attestation: I was present and scrubbed for the entire procedure.    Kavon Hines  Phone Number: [phone]

## 2025-08-18 ENCOUNTER — APPOINTMENT (OUTPATIENT)
Dept: RADIOLOGY | Facility: CLINIC | Age: 70
End: 2025-08-18
Payer: MEDICARE

## 2025-08-18 DIAGNOSIS — C67.0 MALIGNANT NEOPLASM OF TRIGONE OF URINARY BLADDER (MULTI): ICD-10-CM

## 2025-08-18 LAB
CREAT SERPL-MCNC: 0.8 MG/DL (ref 0.6–1.3)
EGFRCR SERPLBLD CKD-EPI 2021: >90 ML/MIN/1.73M*2
LABORATORY COMMENT REPORT: NORMAL
PATH REPORT.FINAL DX SPEC: NORMAL
PATH REPORT.GROSS SPEC: NORMAL
PATH REPORT.RELEVANT HX SPEC: NORMAL
PATH REPORT.TOTAL CANCER: NORMAL

## 2025-08-18 PROCEDURE — 74178 CT ABD&PLV WO CNTR FLWD CNTR: CPT | Performed by: RADIOLOGY

## 2025-08-18 PROCEDURE — 82565 ASSAY OF CREATININE: CPT

## 2025-08-18 PROCEDURE — 76377 3D RENDER W/INTRP POSTPROCES: CPT

## 2025-08-18 PROCEDURE — 76376 3D RENDER W/INTRP POSTPROCES: CPT | Performed by: RADIOLOGY

## 2025-08-18 PROCEDURE — 2550000001 HC RX 255 CONTRASTS

## 2025-08-18 RX ADMIN — IOHEXOL 100 ML: 350 INJECTION, SOLUTION INTRAVENOUS at 10:13

## 2025-08-28 ENCOUNTER — OFFICE VISIT (OUTPATIENT)
Dept: UROLOGY | Facility: HOSPITAL | Age: 70
End: 2025-08-28
Payer: MEDICARE

## 2025-08-28 DIAGNOSIS — C67.0 MALIGNANT NEOPLASM OF TRIGONE OF URINARY BLADDER (MULTI): Primary | ICD-10-CM

## 2025-08-28 DIAGNOSIS — R39.9 LOWER URINARY TRACT SYMPTOMS (LUTS): ICD-10-CM

## 2025-08-28 PROCEDURE — 1159F MED LIST DOCD IN RCRD: CPT | Performed by: UROLOGY

## 2025-08-28 PROCEDURE — 99212 OFFICE O/P EST SF 10 MIN: CPT | Performed by: UROLOGY

## 2025-08-28 PROCEDURE — 99214 OFFICE O/P EST MOD 30 MIN: CPT | Performed by: UROLOGY

## 2025-09-03 ENCOUNTER — APPOINTMENT (OUTPATIENT)
Dept: UROLOGY | Facility: HOSPITAL | Age: 70
End: 2025-09-03
Payer: MEDICARE

## 2025-12-29 ENCOUNTER — APPOINTMENT (OUTPATIENT)
Dept: PRIMARY CARE | Facility: CLINIC | Age: 70
End: 2025-12-29
Payer: MEDICARE

## (undated) DEVICE — TOWEL, SURGICAL, NEURO, O/R, 16 X 26, BLUE, STERILE

## (undated) DEVICE — SYRINGE, 60 CC, IRRIGATION, PISTON, CATH TIP, W/LUER ADAPTER,DISP

## (undated) DEVICE — BLADE, ROTATION MORCELLATOR, 4.8MM X 385MM, PIRHANA, DISPOSABLE

## (undated) DEVICE — TUBING, SUCTION, NON-CONDUCTIVE, W/CONNECT,.25 IN X 12 FT, STERILE, LF

## (undated) DEVICE — TUBING, MORCELLATOR PUMP, DISPOSABLE

## (undated) DEVICE — Device

## (undated) DEVICE — EVACUATOR, UROVAC

## (undated) DEVICE — CATHETER, URETHRAL, FOLEY, 3 WAY, BARDEX IC, 22 FR, 30 CC, SILVER LATEX

## (undated) DEVICE — GLOVE, SURGICAL, PROTEXIS PI ORTHO, 8.0, PF, LF

## (undated) DEVICE — BAG, DRAINAGE, ANTI-REFLUX CHAMBER, 2000ML

## (undated) DEVICE — SYRINGE, 50 CC, LUER LOCK

## (undated) DEVICE — COLLECTION BAG, FLUID, NON-STERILE

## (undated) DEVICE — PLUG, CATHETER

## (undated) DEVICE — IRRIGATION SET, CYSTOSCOPY, TURP, Y, CONTINUOUS, 81 IN

## (undated) DEVICE — CATHETER, LASER URETERAL, 7.1FR, 40CM